# Patient Record
Sex: FEMALE | Race: ASIAN | NOT HISPANIC OR LATINO | Employment: UNEMPLOYED | URBAN - METROPOLITAN AREA
[De-identification: names, ages, dates, MRNs, and addresses within clinical notes are randomized per-mention and may not be internally consistent; named-entity substitution may affect disease eponyms.]

---

## 2017-02-13 ENCOUNTER — COMMUNICATION - HEALTHEAST (OUTPATIENT)
Dept: FAMILY MEDICINE | Facility: CLINIC | Age: 27
End: 2017-02-13

## 2017-02-13 ENCOUNTER — HOSPITAL ENCOUNTER (OUTPATIENT)
Dept: ULTRASOUND IMAGING | Facility: HOSPITAL | Age: 27
Discharge: HOME | End: 2017-02-13
Attending: PHYSICIAN ASSISTANT

## 2017-02-13 ENCOUNTER — COMMUNICATION - HEALTHEAST (OUTPATIENT)
Dept: TELEHEALTH | Facility: CLINIC | Age: 27
End: 2017-02-13

## 2017-02-13 ENCOUNTER — PRENATAL OFFICE VISIT - HEALTHEAST (OUTPATIENT)
Dept: FAMILY MEDICINE | Facility: CLINIC | Age: 27
End: 2017-02-13

## 2017-02-13 DIAGNOSIS — O20.0 THREATENED MISCARRIAGE: ICD-10-CM

## 2017-02-13 DIAGNOSIS — Z34.90 NORMAL PREGNANCY: ICD-10-CM

## 2017-02-13 DIAGNOSIS — Z32.01 POSITIVE PREGNANCY TEST: ICD-10-CM

## 2017-02-13 DIAGNOSIS — N92.6 ABNORMAL MENSES: ICD-10-CM

## 2017-02-13 LAB
HBV SURFACE AG SERPL QL IA: NEGATIVE
HIV 1+2 AB+HIV1 P24 AG SERPL QL IA: NEGATIVE

## 2017-02-14 LAB — SYPHILIS RPR SCREEN - HISTORICAL: NORMAL

## 2017-02-15 ENCOUNTER — AMBULATORY - HEALTHEAST (OUTPATIENT)
Dept: LAB | Facility: CLINIC | Age: 27
End: 2017-02-15

## 2017-02-15 DIAGNOSIS — O20.0 THREATENED MISCARRIAGE: ICD-10-CM

## 2017-02-16 ENCOUNTER — COMMUNICATION - HEALTHEAST (OUTPATIENT)
Dept: FAMILY MEDICINE | Facility: CLINIC | Age: 27
End: 2017-02-16

## 2017-02-16 DIAGNOSIS — O20.0 THREATENED MISCARRIAGE: ICD-10-CM

## 2017-02-20 ENCOUNTER — HOSPITAL ENCOUNTER (OUTPATIENT)
Dept: ULTRASOUND IMAGING | Facility: HOSPITAL | Age: 27
Discharge: HOME | End: 2017-02-20
Attending: PHYSICIAN ASSISTANT

## 2017-02-20 DIAGNOSIS — O20.0 THREATENED MISCARRIAGE: ICD-10-CM

## 2017-02-21 ENCOUNTER — COMMUNICATION - HEALTHEAST (OUTPATIENT)
Dept: FAMILY MEDICINE | Facility: CLINIC | Age: 27
End: 2017-02-21

## 2017-02-21 DIAGNOSIS — Z34.90 NORMAL PREGNANCY: ICD-10-CM

## 2017-02-21 DIAGNOSIS — O20.0 THREATENED MISCARRIAGE: ICD-10-CM

## 2017-03-09 ENCOUNTER — PRENATAL OFFICE VISIT - HEALTHEAST (OUTPATIENT)
Dept: FAMILY MEDICINE | Facility: CLINIC | Age: 27
End: 2017-03-09

## 2017-03-09 DIAGNOSIS — Z34.81 ENCOUNTER FOR SUPERVISION OF OTHER NORMAL PREGNANCY IN FIRST TRIMESTER: ICD-10-CM

## 2017-03-09 DIAGNOSIS — O20.0 THREATENED MISCARRIAGE: ICD-10-CM

## 2017-03-09 ASSESSMENT — MIFFLIN-ST. JEOR: SCORE: 1103.81

## 2017-03-10 ENCOUNTER — HOSPITAL ENCOUNTER (OUTPATIENT)
Dept: ULTRASOUND IMAGING | Facility: HOSPITAL | Age: 27
Discharge: HOME | End: 2017-03-10
Attending: FAMILY MEDICINE

## 2017-03-10 DIAGNOSIS — O20.0 THREATENED MISCARRIAGE: ICD-10-CM

## 2017-03-17 ENCOUNTER — PRENATAL OFFICE VISIT - HEALTHEAST (OUTPATIENT)
Dept: FAMILY MEDICINE | Facility: CLINIC | Age: 27
End: 2017-03-17

## 2017-03-17 ENCOUNTER — COMMUNICATION - HEALTHEAST (OUTPATIENT)
Dept: FAMILY MEDICINE | Facility: CLINIC | Age: 27
End: 2017-03-17

## 2017-03-17 DIAGNOSIS — O03.9 MISCARRIAGE: ICD-10-CM

## 2017-03-17 DIAGNOSIS — O03.9 SPONTANEOUS MISCARRIAGE: ICD-10-CM

## 2017-10-11 ENCOUNTER — PRENATAL OFFICE VISIT - HEALTHEAST (OUTPATIENT)
Dept: FAMILY MEDICINE | Facility: CLINIC | Age: 27
End: 2017-10-11

## 2017-10-11 ENCOUNTER — COMMUNICATION - HEALTHEAST (OUTPATIENT)
Dept: TELEHEALTH | Facility: CLINIC | Age: 27
End: 2017-10-11

## 2017-10-11 DIAGNOSIS — N92.6 ABNORMAL MENSES: ICD-10-CM

## 2017-10-11 DIAGNOSIS — D64.9 ANEMIA: ICD-10-CM

## 2017-10-11 DIAGNOSIS — Z23 NEED FOR IMMUNIZATION AGAINST INFLUENZA: ICD-10-CM

## 2017-10-11 DIAGNOSIS — Z32.01 POSITIVE PREGNANCY TEST: ICD-10-CM

## 2017-10-11 DIAGNOSIS — Z34.90 NORMAL PREGNANCY: ICD-10-CM

## 2017-10-11 LAB — HIV 1+2 AB+HIV1 P24 AG SERPL QL IA: NEGATIVE

## 2017-10-12 LAB
HBV SURFACE AG SERPL QL IA: NEGATIVE
SYPHILIS RPR SCREEN - HISTORICAL: NORMAL

## 2017-10-27 ENCOUNTER — HOSPITAL ENCOUNTER (OUTPATIENT)
Dept: ULTRASOUND IMAGING | Facility: HOSPITAL | Age: 27
Discharge: HOME | End: 2017-10-27
Attending: PHYSICIAN ASSISTANT

## 2017-10-27 DIAGNOSIS — Z32.01 POSITIVE PREGNANCY TEST: ICD-10-CM

## 2017-10-27 DIAGNOSIS — Z34.90 NORMAL PREGNANCY: ICD-10-CM

## 2017-10-30 ENCOUNTER — AMBULATORY - HEALTHEAST (OUTPATIENT)
Dept: FAMILY MEDICINE | Facility: CLINIC | Age: 27
End: 2017-10-30

## 2017-10-30 DIAGNOSIS — Z34.90 NORMAL PREGNANCY: ICD-10-CM

## 2017-11-17 ENCOUNTER — PRENATAL OFFICE VISIT - HEALTHEAST (OUTPATIENT)
Dept: FAMILY MEDICINE | Facility: CLINIC | Age: 27
End: 2017-11-17

## 2017-11-17 ENCOUNTER — COMMUNICATION - HEALTHEAST (OUTPATIENT)
Dept: FAMILY MEDICINE | Facility: CLINIC | Age: 27
End: 2017-11-17

## 2017-11-17 DIAGNOSIS — Z34.82 ENCOUNTER FOR SUPERVISION OF OTHER NORMAL PREGNANCY IN SECOND TRIMESTER: ICD-10-CM

## 2017-11-17 DIAGNOSIS — L70.0 ACNE VULGARIS: ICD-10-CM

## 2017-11-17 ASSESSMENT — MIFFLIN-ST. JEOR: SCORE: 1104.37

## 2017-11-30 LAB
BKR LAB AP ABNORMAL BLEEDING: NO
BKR LAB AP BIRTH CONTROL/HORMONES: NORMAL
BKR LAB AP CERVICAL APPEARANCE: NORMAL
BKR LAB AP GYN ADEQUACY: NORMAL
BKR LAB AP GYN INTERPRETATION: NORMAL
BKR LAB AP HPV REFLEX: NORMAL
BKR LAB AP LMP: NORMAL
BKR LAB AP PATIENT STATUS: NORMAL
BKR LAB AP PREVIOUS ABNORMAL: NORMAL
BKR LAB AP PREVIOUS NORMAL: NORMAL
HIGH RISK?: NO
HPV INTERPRETATION - HISTORICAL: NORMAL
HPV INTERPRETER - HISTORICAL: NORMAL
PATH REPORT.COMMENTS IMP SPEC: NORMAL
RESULT FLAG (HE HISTORICAL CONVERSION): NORMAL

## 2017-12-14 ENCOUNTER — PRENATAL OFFICE VISIT - HEALTHEAST (OUTPATIENT)
Dept: FAMILY MEDICINE | Facility: CLINIC | Age: 27
End: 2017-12-14

## 2017-12-14 ENCOUNTER — COMMUNICATION - HEALTHEAST (OUTPATIENT)
Dept: FAMILY MEDICINE | Facility: CLINIC | Age: 27
End: 2017-12-14

## 2017-12-14 DIAGNOSIS — D56.3 BETA THALASSEMIA TRAIT: ICD-10-CM

## 2017-12-14 DIAGNOSIS — D64.9 ANEMIA: ICD-10-CM

## 2017-12-14 DIAGNOSIS — K02.9 DENTAL CAVITIES: ICD-10-CM

## 2017-12-14 DIAGNOSIS — Z34.90 NORMAL PREGNANCY: ICD-10-CM

## 2017-12-29 ENCOUNTER — HOSPITAL ENCOUNTER (OUTPATIENT)
Dept: ULTRASOUND IMAGING | Facility: HOSPITAL | Age: 27
Discharge: HOME | End: 2017-12-29
Attending: FAMILY MEDICINE

## 2017-12-29 DIAGNOSIS — Z34.90 NORMAL PREGNANCY: ICD-10-CM

## 2018-01-02 ENCOUNTER — COMMUNICATION - HEALTHEAST (OUTPATIENT)
Dept: FAMILY MEDICINE | Facility: CLINIC | Age: 28
End: 2018-01-02

## 2018-01-11 ENCOUNTER — PRENATAL OFFICE VISIT - HEALTHEAST (OUTPATIENT)
Dept: FAMILY MEDICINE | Facility: CLINIC | Age: 28
End: 2018-01-11

## 2018-01-11 ENCOUNTER — RECORDS - HEALTHEAST (OUTPATIENT)
Dept: ADMINISTRATIVE | Facility: OTHER | Age: 28
End: 2018-01-11

## 2018-01-11 DIAGNOSIS — Z34.82 ENCOUNTER FOR SUPERVISION OF OTHER NORMAL PREGNANCY IN SECOND TRIMESTER: ICD-10-CM

## 2018-01-11 DIAGNOSIS — O28.3 ECHOGENIC INTRACARDIAC FOCUS OF FETUS ON PRENATAL ULTRASOUND: ICD-10-CM

## 2018-01-11 DIAGNOSIS — D64.9 ANEMIA: ICD-10-CM

## 2018-01-11 DIAGNOSIS — Z34.92 NORMAL PREGNANCY IN SECOND TRIMESTER: ICD-10-CM

## 2018-01-11 LAB
ALBUMIN UR-MCNC: NEGATIVE MG/DL
FERRITIN SERPL-MCNC: 19 NG/ML (ref 10–130)
GLUCOSE UR STRIP-MCNC: NEGATIVE MG/DL
HGB BLD-MCNC: 9.6 G/DL (ref 12–16)
KETONES UR STRIP-MCNC: NEGATIVE MG/DL

## 2018-01-11 ASSESSMENT — MIFFLIN-ST. JEOR: SCORE: 1142.93

## 2018-01-16 ENCOUNTER — RECORDS - HEALTHEAST (OUTPATIENT)
Dept: ADMINISTRATIVE | Facility: OTHER | Age: 28
End: 2018-01-16

## 2018-01-18 ENCOUNTER — COMMUNICATION - HEALTHEAST (OUTPATIENT)
Dept: FAMILY MEDICINE | Facility: CLINIC | Age: 28
End: 2018-01-18

## 2018-02-08 ENCOUNTER — PRENATAL OFFICE VISIT - HEALTHEAST (OUTPATIENT)
Dept: FAMILY MEDICINE | Facility: CLINIC | Age: 28
End: 2018-02-08

## 2018-02-08 DIAGNOSIS — Z34.82 ENCOUNTER FOR SUPERVISION OF OTHER NORMAL PREGNANCY IN SECOND TRIMESTER: ICD-10-CM

## 2018-02-08 DIAGNOSIS — O99.810 ABNORMAL GLUCOSE TOLERANCE TEST (GTT) DURING PREGNANCY, ANTEPARTUM: ICD-10-CM

## 2018-02-08 DIAGNOSIS — Z34.90 NORMAL PREGNANCY: ICD-10-CM

## 2018-02-08 DIAGNOSIS — D64.9 ANEMIA: ICD-10-CM

## 2018-02-08 DIAGNOSIS — D56.3 BETA THALASSEMIA TRAIT: ICD-10-CM

## 2018-02-08 LAB
ALBUMIN UR-MCNC: NEGATIVE MG/DL
FASTING STATUS PATIENT QL REPORTED: YES
GLUCOSE 1H P 50 G GLC PO SERPL-MCNC: 140 MG/DL (ref 70–139)
GLUCOSE UR STRIP-MCNC: NEGATIVE MG/DL
HGB BLD-MCNC: 10.6 G/DL (ref 12–16)
IRON SATN MFR SERPL: 50 % (ref 20–50)
IRON SERPL-MCNC: 173 UG/DL (ref 42–175)
KETONES UR STRIP-MCNC: NEGATIVE MG/DL
TIBC SERPL-MCNC: 348 UG/DL (ref 313–563)
TRANSFERRIN SERPL-MCNC: 278 MG/DL (ref 212–360)

## 2018-02-09 LAB — SYPHILIS RPR SCREEN - HISTORICAL: NORMAL

## 2018-03-08 ENCOUNTER — COMMUNICATION - HEALTHEAST (OUTPATIENT)
Dept: FAMILY MEDICINE | Facility: CLINIC | Age: 28
End: 2018-03-08

## 2018-03-09 ENCOUNTER — COMMUNICATION - HEALTHEAST (OUTPATIENT)
Dept: FAMILY MEDICINE | Facility: CLINIC | Age: 28
End: 2018-03-09

## 2018-03-12 ENCOUNTER — COMMUNICATION - HEALTHEAST (OUTPATIENT)
Dept: FAMILY MEDICINE | Facility: CLINIC | Age: 28
End: 2018-03-12

## 2018-03-22 ENCOUNTER — PRENATAL OFFICE VISIT - HEALTHEAST (OUTPATIENT)
Dept: FAMILY MEDICINE | Facility: CLINIC | Age: 28
End: 2018-03-22

## 2018-03-22 DIAGNOSIS — O99.810 ABNORMAL GLUCOSE TOLERANCE TEST (GTT) DURING PREGNANCY, ANTEPARTUM: ICD-10-CM

## 2018-03-22 DIAGNOSIS — D64.9 ANEMIA: ICD-10-CM

## 2018-03-22 DIAGNOSIS — Z34.83 ENCOUNTER FOR SUPERVISION OF OTHER NORMAL PREGNANCY IN THIRD TRIMESTER: ICD-10-CM

## 2018-03-22 LAB
ALBUMIN UR-MCNC: ABNORMAL MG/DL
FASTING STATUS PATIENT QL REPORTED: YES
GLUCOSE 1H P 100 G GLC PO SERPL-MCNC: 163 MG/DL
GLUCOSE 2H P 100 G GLC PO SERPL-MCNC: 159 MG/DL
GLUCOSE 3H P 100 G GLC PO SERPL-MCNC: 79 MG/DL
GLUCOSE P FAST SERPL-MCNC: 71 MG/DL
GLUCOSE UR STRIP-MCNC: NEGATIVE MG/DL
HGB BLD-MCNC: 9.6 G/DL (ref 12–16)
KETONES UR STRIP-MCNC: NEGATIVE MG/DL

## 2018-03-23 ENCOUNTER — COMMUNICATION - HEALTHEAST (OUTPATIENT)
Dept: FAMILY MEDICINE | Facility: CLINIC | Age: 28
End: 2018-03-23

## 2018-04-05 ENCOUNTER — PRENATAL OFFICE VISIT - HEALTHEAST (OUTPATIENT)
Dept: FAMILY MEDICINE | Facility: CLINIC | Age: 28
End: 2018-04-05

## 2018-04-05 DIAGNOSIS — Z34.93 NORMAL PREGNANCY IN THIRD TRIMESTER: ICD-10-CM

## 2018-04-05 DIAGNOSIS — D64.9 ANEMIA: ICD-10-CM

## 2018-04-05 DIAGNOSIS — Z34.83 ENCOUNTER FOR SUPERVISION OF OTHER NORMAL PREGNANCY IN THIRD TRIMESTER: ICD-10-CM

## 2018-04-05 LAB
ALBUMIN UR-MCNC: NEGATIVE MG/DL
GLUCOSE UR STRIP-MCNC: NEGATIVE MG/DL
KETONES UR STRIP-MCNC: NEGATIVE MG/DL

## 2018-04-26 ENCOUNTER — PRENATAL OFFICE VISIT - HEALTHEAST (OUTPATIENT)
Dept: FAMILY MEDICINE | Facility: CLINIC | Age: 28
End: 2018-04-26

## 2018-04-26 DIAGNOSIS — D56.3 BETA THALASSEMIA TRAIT: ICD-10-CM

## 2018-04-26 DIAGNOSIS — Z34.83 ENCOUNTER FOR SUPERVISION OF OTHER NORMAL PREGNANCY IN THIRD TRIMESTER: ICD-10-CM

## 2018-04-26 DIAGNOSIS — D64.9 ANEMIA: ICD-10-CM

## 2018-04-26 LAB
ALBUMIN UR-MCNC: ABNORMAL MG/DL
GLUCOSE UR STRIP-MCNC: NEGATIVE MG/DL
HGB BLD-MCNC: 9.9 G/DL (ref 12–16)
IRON SATN MFR SERPL: 10 % (ref 20–50)
IRON SERPL-MCNC: 48 UG/DL (ref 42–175)
KETONES UR STRIP-MCNC: NEGATIVE MG/DL
TIBC SERPL-MCNC: 494 UG/DL (ref 313–563)
TRANSFERRIN SERPL-MCNC: 396 MG/DL (ref 212–360)

## 2018-04-27 LAB
ALLERGIC TO PENICILLIN: NO
GP B STREP DNA SPEC QL NAA+PROBE: POSITIVE

## 2018-05-01 ENCOUNTER — PRENATAL OFFICE VISIT - HEALTHEAST (OUTPATIENT)
Dept: FAMILY MEDICINE | Facility: CLINIC | Age: 28
End: 2018-05-01

## 2018-05-01 DIAGNOSIS — Z34.83 ENCOUNTER FOR SUPERVISION OF OTHER NORMAL PREGNANCY IN THIRD TRIMESTER: ICD-10-CM

## 2018-05-01 LAB
ALBUMIN UR-MCNC: ABNORMAL MG/DL
GLUCOSE UR STRIP-MCNC: NEGATIVE MG/DL
KETONES UR STRIP-MCNC: NEGATIVE MG/DL

## 2018-05-10 ENCOUNTER — PRENATAL OFFICE VISIT - HEALTHEAST (OUTPATIENT)
Dept: FAMILY MEDICINE | Facility: CLINIC | Age: 28
End: 2018-05-10

## 2018-05-10 DIAGNOSIS — Z34.83 ENCOUNTER FOR SUPERVISION OF OTHER NORMAL PREGNANCY IN THIRD TRIMESTER: ICD-10-CM

## 2018-05-10 ASSESSMENT — MIFFLIN-ST. JEOR: SCORE: 1217.77

## 2018-05-19 ENCOUNTER — HOME CARE/HOSPICE - HEALTHEAST (OUTPATIENT)
Dept: HOME HEALTH SERVICES | Facility: HOME HEALTH | Age: 28
End: 2018-05-19

## 2018-05-20 ENCOUNTER — HOME CARE/HOSPICE - HEALTHEAST (OUTPATIENT)
Dept: HOME HEALTH SERVICES | Facility: HOME HEALTH | Age: 28
End: 2018-05-20

## 2018-07-19 ENCOUNTER — OFFICE VISIT - HEALTHEAST (OUTPATIENT)
Dept: FAMILY MEDICINE | Facility: CLINIC | Age: 28
End: 2018-07-19

## 2018-07-19 DIAGNOSIS — D64.9 ANEMIA: ICD-10-CM

## 2018-07-19 LAB
HGB BLD-MCNC: 11 G/DL (ref 12–16)
IRON SATN MFR SERPL: 40 % (ref 20–50)
IRON SERPL-MCNC: 108 UG/DL (ref 42–175)
TIBC SERPL-MCNC: 273 UG/DL (ref 313–563)
TRANSFERRIN SERPL-MCNC: 218 MG/DL (ref 212–360)

## 2019-10-14 ENCOUNTER — OFFICE VISIT - HEALTHEAST (OUTPATIENT)
Dept: FAMILY MEDICINE | Facility: CLINIC | Age: 29
End: 2019-10-14

## 2019-10-14 DIAGNOSIS — E83.52 HYPERCALCEMIA: ICD-10-CM

## 2019-10-14 DIAGNOSIS — R20.0 NUMBNESS AND TINGLING IN BOTH HANDS: ICD-10-CM

## 2019-10-14 DIAGNOSIS — R20.0 NUMBNESS AND TINGLING OF BOTH FEET: ICD-10-CM

## 2019-10-14 DIAGNOSIS — Z00.00 HEALTH CARE MAINTENANCE: ICD-10-CM

## 2019-10-14 DIAGNOSIS — R20.2 NUMBNESS AND TINGLING IN BOTH HANDS: ICD-10-CM

## 2019-10-14 DIAGNOSIS — D56.3 BETA THALASSEMIA TRAIT: ICD-10-CM

## 2019-10-14 DIAGNOSIS — R77.9 ELEVATED SERUM PROTEIN LEVEL: ICD-10-CM

## 2019-10-14 DIAGNOSIS — R20.2 NUMBNESS AND TINGLING OF BOTH FEET: ICD-10-CM

## 2019-10-14 LAB
ALBUMIN SERPL-MCNC: 4.5 G/DL (ref 3.5–5)
ALP SERPL-CCNC: 83 U/L (ref 45–120)
ALT SERPL W P-5'-P-CCNC: 15 U/L (ref 0–45)
ANION GAP SERPL CALCULATED.3IONS-SCNC: 11 MMOL/L (ref 5–18)
AST SERPL W P-5'-P-CCNC: 17 U/L (ref 0–40)
BILIRUB SERPL-MCNC: 1.1 MG/DL (ref 0–1)
BUN SERPL-MCNC: 9 MG/DL (ref 8–22)
CALCIUM SERPL-MCNC: 10.8 MG/DL (ref 8.5–10.5)
CHLORIDE BLD-SCNC: 104 MMOL/L (ref 98–107)
CO2 SERPL-SCNC: 26 MMOL/L (ref 22–31)
CREAT SERPL-MCNC: 0.69 MG/DL (ref 0.6–1.1)
ERYTHROCYTE [DISTWIDTH] IN BLOOD BY AUTOMATED COUNT: 15.9 % (ref 11–14.5)
FOLATE SERPL-MCNC: 7.9 NG/ML
GFR SERPL CREATININE-BSD FRML MDRD: >60 ML/MIN/1.73M2
GLUCOSE BLD-MCNC: 87 MG/DL (ref 70–125)
HBA1C MFR BLD: 5.1 % (ref 3.5–6)
HCT VFR BLD AUTO: 36.4 % (ref 35–47)
HGB BLD-MCNC: 11.8 G/DL (ref 12–16)
IRON SATN MFR SERPL: 28 % (ref 20–50)
IRON SERPL-MCNC: 81 UG/DL (ref 42–175)
MCH RBC QN AUTO: 21.1 PG (ref 27–34)
MCHC RBC AUTO-ENTMCNC: 32.5 G/DL (ref 32–36)
MCV RBC AUTO: 65 FL (ref 80–100)
PLATELET # BLD AUTO: 421 THOU/UL (ref 140–440)
PMV BLD AUTO: 8.1 FL (ref 7–10)
POTASSIUM BLD-SCNC: 3.9 MMOL/L (ref 3.5–5)
PROT SERPL-MCNC: 8.4 G/DL (ref 6–8)
RBC # BLD AUTO: 5.6 MILL/UL (ref 3.8–5.4)
SODIUM SERPL-SCNC: 141 MMOL/L (ref 136–145)
TIBC SERPL-MCNC: 290 UG/DL (ref 313–563)
TRANSFERRIN SERPL-MCNC: 232 MG/DL (ref 212–360)
TSH SERPL DL<=0.005 MIU/L-ACNC: 1.48 UIU/ML (ref 0.3–5)
VIT B12 SERPL-MCNC: 618 PG/ML (ref 213–816)
WBC: 10.6 THOU/UL (ref 4–11)

## 2019-10-14 ASSESSMENT — MIFFLIN-ST. JEOR: SCORE: 1104.94

## 2019-10-15 ENCOUNTER — COMMUNICATION - HEALTHEAST (OUTPATIENT)
Dept: FAMILY MEDICINE | Facility: CLINIC | Age: 29
End: 2019-10-15

## 2019-10-25 ENCOUNTER — AMBULATORY - HEALTHEAST (OUTPATIENT)
Dept: LAB | Facility: CLINIC | Age: 29
End: 2019-10-25

## 2019-10-25 DIAGNOSIS — E83.52 HYPERCALCEMIA: ICD-10-CM

## 2019-10-25 DIAGNOSIS — R77.9 ELEVATED SERUM PROTEIN LEVEL: ICD-10-CM

## 2019-10-25 LAB
CALCIUM, IONIZED MEASURED: 1.17 MMOL/L (ref 1.11–1.3)
ION CA PH 7.4: 1.16 MMOL/L (ref 1.11–1.3)
PH: 7.37 (ref 7.35–7.45)
PTH-INTACT SERPL-MCNC: 45 PG/ML (ref 10–86)

## 2019-10-28 ENCOUNTER — COMMUNICATION - HEALTHEAST (OUTPATIENT)
Dept: FAMILY MEDICINE | Facility: CLINIC | Age: 29
End: 2019-10-28

## 2019-10-28 ENCOUNTER — AMBULATORY - HEALTHEAST (OUTPATIENT)
Dept: FAMILY MEDICINE | Facility: CLINIC | Age: 29
End: 2019-10-28

## 2019-10-28 DIAGNOSIS — E56.9 VITAMIN DEFICIENCY: ICD-10-CM

## 2019-10-28 LAB
25(OH)D3 SERPL-MCNC: 7.4 NG/ML (ref 30–80)
25(OH)D3 SERPL-MCNC: 7.4 NG/ML (ref 30–80)
ALBUMIN PERCENT: 59.6 % (ref 51–67)
ALBUMIN SERPL ELPH-MCNC: 4.7 G/DL (ref 3.2–4.7)
ALPHA 1 PERCENT: 2.3 % (ref 2–4)
ALPHA 2 PERCENT: 9 % (ref 5–13)
ALPHA1 GLOB SERPL ELPH-MCNC: 0.2 G/DL (ref 0.1–0.3)
ALPHA2 GLOB SERPL ELPH-MCNC: 0.7 G/DL (ref 0.4–0.9)
B-GLOBULIN SERPL ELPH-MCNC: 0.8 G/DL (ref 0.7–1.2)
BETA PERCENT: 10.1 % (ref 10–17)
GAMMA GLOB SERPL ELPH-MCNC: 1.5 G/DL (ref 0.6–1.4)
GAMMA GLOBULIN PERCENT: 19 % (ref 9–20)
PATH ICD:: ABNORMAL
PROT PATTERN SERPL ELPH-IMP: ABNORMAL
PROT SERPL-MCNC: 7.9 G/DL (ref 6–8)
REVIEWING PATHOLOGIST: ABNORMAL

## 2020-02-19 ENCOUNTER — COMMUNICATION - HEALTHEAST (OUTPATIENT)
Dept: FAMILY MEDICINE | Facility: CLINIC | Age: 30
End: 2020-02-19

## 2020-02-19 DIAGNOSIS — E56.9 VITAMIN DEFICIENCY: ICD-10-CM

## 2020-06-04 ENCOUNTER — COMMUNICATION - HEALTHEAST (OUTPATIENT)
Dept: FAMILY MEDICINE | Facility: CLINIC | Age: 30
End: 2020-06-04

## 2020-06-04 DIAGNOSIS — E56.9 VITAMIN DEFICIENCY: ICD-10-CM

## 2020-08-03 ENCOUNTER — AMBULATORY - HEALTHEAST (OUTPATIENT)
Dept: LAB | Facility: CLINIC | Age: 30
End: 2020-08-03

## 2020-08-03 DIAGNOSIS — E56.9 VITAMIN DEFICIENCY: ICD-10-CM

## 2020-08-04 ENCOUNTER — COMMUNICATION - HEALTHEAST (OUTPATIENT)
Dept: FAMILY MEDICINE | Facility: CLINIC | Age: 30
End: 2020-08-04

## 2020-08-04 LAB
25(OH)D3 SERPL-MCNC: 36.8 NG/ML (ref 30–80)
25(OH)D3 SERPL-MCNC: 36.8 NG/ML (ref 30–80)

## 2020-10-26 ENCOUNTER — COMMUNICATION - HEALTHEAST (OUTPATIENT)
Dept: FAMILY MEDICINE | Facility: CLINIC | Age: 30
End: 2020-10-26

## 2020-11-03 ENCOUNTER — AMBULATORY - HEALTHEAST (OUTPATIENT)
Dept: NURSING | Facility: CLINIC | Age: 30
End: 2020-11-03

## 2020-11-04 ENCOUNTER — PRENATAL OFFICE VISIT - HEALTHEAST (OUTPATIENT)
Dept: FAMILY MEDICINE | Facility: CLINIC | Age: 30
End: 2020-11-04

## 2020-11-04 DIAGNOSIS — N92.6 ABNORMAL MENSES: ICD-10-CM

## 2020-11-04 DIAGNOSIS — Z3A.09 9 WEEKS GESTATION OF PREGNANCY: ICD-10-CM

## 2020-11-04 DIAGNOSIS — D64.9 ANEMIA, UNSPECIFIED TYPE: ICD-10-CM

## 2020-11-04 DIAGNOSIS — Z32.01 PREGNANCY TEST POSITIVE: ICD-10-CM

## 2020-11-04 LAB
ALBUMIN UR-MCNC: NEGATIVE MG/DL
AMPHETAMINES UR QL SCN: NORMAL
BARBITURATES UR QL: NORMAL
BASOPHILS # BLD AUTO: 0 THOU/UL (ref 0–0.2)
BASOPHILS NFR BLD AUTO: 1 % (ref 0–2)
BENZODIAZ UR QL: NORMAL
CANNABINOIDS UR QL SCN: NORMAL
COCAINE UR QL: NORMAL
CREAT UR-MCNC: 37.3 MG/DL
EOSINOPHIL # BLD AUTO: 0 THOU/UL (ref 0–0.4)
EOSINOPHIL NFR BLD AUTO: 0 % (ref 0–6)
ERYTHROCYTE [DISTWIDTH] IN BLOOD BY AUTOMATED COUNT: 18.8 % (ref 11–14.5)
GLUCOSE UR STRIP-MCNC: NEGATIVE MG/DL
HCG UR QL: POSITIVE
HCT VFR BLD AUTO: 31.5 % (ref 35–47)
HGB BLD-MCNC: 10 G/DL (ref 12–16)
HIV 1+2 AB+HIV1 P24 AG SERPL QL IA: NEGATIVE
IMM GRANULOCYTES # BLD: 0 THOU/UL
IMM GRANULOCYTES NFR BLD: 0 %
KETONES UR STRIP-MCNC: NEGATIVE MG/DL
LYMPHOCYTES # BLD AUTO: 1.3 THOU/UL (ref 0.8–4.4)
LYMPHOCYTES NFR BLD AUTO: 19 % (ref 20–40)
MCH RBC QN AUTO: 21 PG (ref 27–34)
MCHC RBC AUTO-ENTMCNC: 31.7 G/DL (ref 32–36)
MCV RBC AUTO: 66 FL (ref 80–100)
MONOCYTES # BLD AUTO: 0.4 THOU/UL (ref 0–0.9)
MONOCYTES NFR BLD AUTO: 6 % (ref 2–10)
NEUTROPHILS # BLD AUTO: 5.1 THOU/UL (ref 2–7.7)
NEUTROPHILS NFR BLD AUTO: 74 % (ref 50–70)
OPIATES UR QL SCN: NORMAL
OXYCODONE UR QL: NORMAL
PCP UR QL SCN: NORMAL
PLATELET # BLD AUTO: 370 THOU/UL (ref 140–440)
PMV BLD AUTO: 10.6 FL (ref 8.5–12.5)
RBC # BLD AUTO: 4.76 MILL/UL (ref 3.8–5.4)
WBC: 6.9 THOU/UL (ref 4–11)

## 2020-11-05 LAB
ABO/RH(D): NORMAL
ABORH REPEAT: NORMAL
ANTIBODY SCREEN: NEGATIVE
BACTERIA SPEC CULT: NO GROWTH
HBV SURFACE AG SERPL QL IA: NEGATIVE
RUBV IGG SERPL QL IA: POSITIVE
T PALLIDUM AB SER QL: NEGATIVE

## 2020-11-06 ENCOUNTER — HOSPITAL ENCOUNTER (OUTPATIENT)
Dept: ULTRASOUND IMAGING | Facility: HOSPITAL | Age: 30
Discharge: HOME | End: 2020-11-06
Attending: PHYSICIAN ASSISTANT

## 2020-11-06 DIAGNOSIS — Z32.01 PREGNANCY TEST POSITIVE: ICD-10-CM

## 2020-11-06 DIAGNOSIS — Z3A.09 9 WEEKS GESTATION OF PREGNANCY: ICD-10-CM

## 2020-11-06 LAB
COLLECTION METHOD: NORMAL
LEAD BLD-MCNC: NORMAL UG/DL
LEAD BLDV-MCNC: <2 UG/DL

## 2020-12-28 ENCOUNTER — PRENATAL OFFICE VISIT - HEALTHEAST (OUTPATIENT)
Dept: FAMILY MEDICINE | Facility: CLINIC | Age: 30
End: 2020-12-28

## 2020-12-28 DIAGNOSIS — Z34.80 ENCOUNTER FOR SUPERVISION OF OTHER NORMAL PREGNANCY, UNSPECIFIED TRIMESTER: ICD-10-CM

## 2020-12-28 DIAGNOSIS — Z3A.09 9 WEEKS GESTATION OF PREGNANCY: ICD-10-CM

## 2020-12-28 DIAGNOSIS — D56.3 BETA THALASSEMIA TRAIT: ICD-10-CM

## 2020-12-28 DIAGNOSIS — Z34.82 ENCOUNTER FOR SUPERVISION OF OTHER NORMAL PREGNANCY IN SECOND TRIMESTER: ICD-10-CM

## 2020-12-28 DIAGNOSIS — E56.9 VITAMIN DEFICIENCY: ICD-10-CM

## 2020-12-28 DIAGNOSIS — Z33.1 PREGNANT STATE, INCIDENTAL: ICD-10-CM

## 2020-12-28 LAB
ALBUMIN UR-MCNC: NEGATIVE MG/DL
GLUCOSE UR STRIP-MCNC: NEGATIVE MG/DL
HGB BLD-MCNC: 10 G/DL (ref 12–16)
IRON SATN MFR SERPL: 41 % (ref 20–50)
IRON SERPL-MCNC: 124 UG/DL (ref 42–175)
KETONES UR STRIP-MCNC: NEGATIVE MG/DL
TIBC SERPL-MCNC: 302 UG/DL (ref 313–563)
TRANSFERRIN SERPL-MCNC: 242 MG/DL (ref 212–360)

## 2020-12-31 LAB
# FETUSES US: NORMAL
AFP MOM SERPL: 0.65
AFP SERPL-MCNC: 26 NG/ML
AGE - REPORTED: 33 YR
CURRENT SMOKER: NO
FAMILY MEMBER DISEASES HX: NO
GA METHOD: NORMAL
GA: NORMAL WK
HCG MOM SERPL: 1.53
HCG SERPL-ACNC: NORMAL IU/L
HX OF HEREDITARY DISORDERS: NO
IDDM PATIENT QL: NO
INHIBIN A MOM SERPL: 1.06
INHIBIN A SERPL-MCNC: 184 PG/ML
INTEGRATED SCN PATIENT-IMP: NORMAL
PATHOLOGY STUDY: NORMAL
SPECIMEN DRAWN SERPL: NORMAL
U ESTRIOL MOM SERPL: 1.37
U ESTRIOL SERPL-MCNC: 2.09 NG/ML

## 2021-01-05 ENCOUNTER — COMMUNICATION - HEALTHEAST (OUTPATIENT)
Dept: FAMILY MEDICINE | Facility: CLINIC | Age: 31
End: 2021-01-05

## 2021-01-18 ENCOUNTER — HOSPITAL ENCOUNTER (OUTPATIENT)
Dept: ULTRASOUND IMAGING | Facility: HOSPITAL | Age: 31
Discharge: HOME | End: 2021-01-18
Attending: FAMILY MEDICINE

## 2021-01-18 DIAGNOSIS — Z34.80 ENCOUNTER FOR SUPERVISION OF OTHER NORMAL PREGNANCY, UNSPECIFIED TRIMESTER: ICD-10-CM

## 2021-01-25 ENCOUNTER — OFFICE VISIT - HEALTHEAST (OUTPATIENT)
Dept: FAMILY MEDICINE | Facility: CLINIC | Age: 31
End: 2021-01-25

## 2021-01-25 DIAGNOSIS — Z34.80 ENCOUNTER FOR SUPERVISION OF OTHER NORMAL PREGNANCY, UNSPECIFIED TRIMESTER: ICD-10-CM

## 2021-01-25 DIAGNOSIS — Z60.3 LANGUAGE BARRIER: ICD-10-CM

## 2021-01-25 DIAGNOSIS — D56.3 BETA THALASSEMIA TRAIT: ICD-10-CM

## 2021-01-25 DIAGNOSIS — E56.9 VITAMIN DEFICIENCY: ICD-10-CM

## 2021-01-25 DIAGNOSIS — Z34.82 ENCOUNTER FOR SUPERVISION OF OTHER NORMAL PREGNANCY IN SECOND TRIMESTER: ICD-10-CM

## 2021-01-25 DIAGNOSIS — Z75.8 LANGUAGE BARRIER: ICD-10-CM

## 2021-01-25 SDOH — SOCIAL STABILITY - SOCIAL INSECURITY: ACCULTURATION DIFFICULTY: Z60.3

## 2021-02-22 ENCOUNTER — COMMUNICATION - HEALTHEAST (OUTPATIENT)
Dept: FAMILY MEDICINE | Facility: CLINIC | Age: 31
End: 2021-02-22

## 2021-02-22 ENCOUNTER — PRENATAL OFFICE VISIT - HEALTHEAST (OUTPATIENT)
Dept: FAMILY MEDICINE | Facility: CLINIC | Age: 31
End: 2021-02-22

## 2021-02-22 DIAGNOSIS — D56.3 BETA THALASSEMIA TRAIT: ICD-10-CM

## 2021-02-22 DIAGNOSIS — D64.9 ANEMIA, UNSPECIFIED TYPE: ICD-10-CM

## 2021-02-22 DIAGNOSIS — O99.810 ABNORMAL GLUCOSE TOLERANCE TEST IN PREGNANCY: ICD-10-CM

## 2021-02-22 DIAGNOSIS — Z11.59 SCREENING FOR VIRAL DISEASE: ICD-10-CM

## 2021-02-22 DIAGNOSIS — Z34.82 ENCOUNTER FOR SUPERVISION OF OTHER NORMAL PREGNANCY IN SECOND TRIMESTER: ICD-10-CM

## 2021-02-22 LAB
ALBUMIN UR-MCNC: NEGATIVE MG/DL
FASTING STATUS PATIENT QL REPORTED: NO
GLUCOSE 1H P 50 G GLC PO SERPL-MCNC: 187 MG/DL (ref 70–139)
GLUCOSE UR STRIP-MCNC: NEGATIVE MG/DL
HGB BLD-MCNC: 9.2 G/DL (ref 12–16)
IRON SATN MFR SERPL: 32 % (ref 20–50)
IRON SERPL-MCNC: 112 UG/DL (ref 42–175)
KETONES UR STRIP-MCNC: NEGATIVE MG/DL
TIBC SERPL-MCNC: 347 UG/DL (ref 313–563)
TRANSFERRIN SERPL-MCNC: 277 MG/DL (ref 212–360)

## 2021-02-23 LAB — T PALLIDUM AB SER QL: NEGATIVE

## 2021-02-26 ENCOUNTER — AMBULATORY - HEALTHEAST (OUTPATIENT)
Dept: LAB | Facility: CLINIC | Age: 31
End: 2021-02-26

## 2021-02-26 ENCOUNTER — AMBULATORY - HEALTHEAST (OUTPATIENT)
Dept: FAMILY MEDICINE | Facility: CLINIC | Age: 31
End: 2021-02-26

## 2021-02-26 DIAGNOSIS — O99.810 ABNORMAL GLUCOSE TOLERANCE TEST IN PREGNANCY: ICD-10-CM

## 2021-02-26 DIAGNOSIS — O24.410 DIET CONTROLLED GESTATIONAL DIABETES MELLITUS (GDM), ANTEPARTUM: ICD-10-CM

## 2021-02-26 DIAGNOSIS — Z11.59 SCREENING FOR VIRAL DISEASE: ICD-10-CM

## 2021-02-26 LAB
FASTING STATUS PATIENT QL REPORTED: YES
GLUCOSE 1H P 100 G GLC PO SERPL-MCNC: 195 MG/DL
GLUCOSE 2H P 100 G GLC PO SERPL-MCNC: 169 MG/DL
GLUCOSE 3H P 100 G GLC PO SERPL-MCNC: 97 MG/DL
GLUCOSE P FAST SERPL-MCNC: 72 MG/DL

## 2021-03-01 LAB — HCV AB SERPL QL IA: NEGATIVE

## 2021-03-05 ENCOUNTER — AMBULATORY - HEALTHEAST (OUTPATIENT)
Dept: EDUCATION SERVICES | Facility: CLINIC | Age: 31
End: 2021-03-05

## 2021-03-05 DIAGNOSIS — O24.410 DIET CONTROLLED GESTATIONAL DIABETES MELLITUS (GDM), ANTEPARTUM: ICD-10-CM

## 2021-03-15 ENCOUNTER — AMBULATORY - HEALTHEAST (OUTPATIENT)
Dept: EDUCATION SERVICES | Facility: CLINIC | Age: 31
End: 2021-03-15

## 2021-03-15 DIAGNOSIS — O24.419 GDM (GESTATIONAL DIABETES MELLITUS): ICD-10-CM

## 2021-03-23 ENCOUNTER — OFFICE VISIT - HEALTHEAST (OUTPATIENT)
Dept: FAMILY MEDICINE | Facility: CLINIC | Age: 31
End: 2021-03-23

## 2021-03-23 DIAGNOSIS — Z34.80 ENCOUNTER FOR SUPERVISION OF OTHER NORMAL PREGNANCY, UNSPECIFIED TRIMESTER: ICD-10-CM

## 2021-03-23 DIAGNOSIS — O24.410 DIET CONTROLLED GESTATIONAL DIABETES MELLITUS (GDM), ANTEPARTUM: ICD-10-CM

## 2021-03-23 DIAGNOSIS — Z75.8 LANGUAGE BARRIER: ICD-10-CM

## 2021-03-23 DIAGNOSIS — Z60.3 LANGUAGE BARRIER: ICD-10-CM

## 2021-03-23 DIAGNOSIS — O09.893 SUPERVISION OF OTHER HIGH RISK PREGNANCIES, THIRD TRIMESTER: ICD-10-CM

## 2021-03-23 DIAGNOSIS — E56.9 VITAMIN DEFICIENCY: ICD-10-CM

## 2021-03-23 DIAGNOSIS — D56.3 BETA THALASSEMIA TRAIT: ICD-10-CM

## 2021-03-23 SDOH — SOCIAL STABILITY - SOCIAL INSECURITY: ACCULTURATION DIFFICULTY: Z60.3

## 2021-03-29 ENCOUNTER — AMBULATORY - HEALTHEAST (OUTPATIENT)
Dept: EDUCATION SERVICES | Facility: CLINIC | Age: 31
End: 2021-03-29

## 2021-03-29 DIAGNOSIS — O24.419 GDM (GESTATIONAL DIABETES MELLITUS): ICD-10-CM

## 2021-04-12 ENCOUNTER — AMBULATORY - HEALTHEAST (OUTPATIENT)
Dept: EDUCATION SERVICES | Facility: CLINIC | Age: 31
End: 2021-04-12

## 2021-04-12 DIAGNOSIS — O24.419 GDM (GESTATIONAL DIABETES MELLITUS): ICD-10-CM

## 2021-04-13 ENCOUNTER — COMMUNICATION - HEALTHEAST (OUTPATIENT)
Dept: FAMILY MEDICINE | Facility: CLINIC | Age: 31
End: 2021-04-13

## 2021-04-13 DIAGNOSIS — O24.419 GDM (GESTATIONAL DIABETES MELLITUS): ICD-10-CM

## 2021-04-16 ENCOUNTER — PRENATAL OFFICE VISIT - HEALTHEAST (OUTPATIENT)
Dept: FAMILY MEDICINE | Facility: CLINIC | Age: 31
End: 2021-04-16

## 2021-04-16 DIAGNOSIS — O09.893 SUPERVISION OF OTHER HIGH RISK PREGNANCIES, THIRD TRIMESTER: ICD-10-CM

## 2021-04-16 DIAGNOSIS — D56.3 BETA THALASSEMIA TRAIT: ICD-10-CM

## 2021-04-16 LAB
ALBUMIN UR-MCNC: NEGATIVE G/DL
GLUCOSE UR STRIP-MCNC: NEGATIVE MG/DL
HGB BLD-MCNC: 8.9 G/DL (ref 12–16)
IRON SATN MFR SERPL: 29 % (ref 20–50)
IRON SERPL-MCNC: 100 UG/DL (ref 42–175)
KETONES UR STRIP-MCNC: NEGATIVE MG/DL
TIBC SERPL-MCNC: 340 UG/DL (ref 313–563)
TRANSFERRIN SERPL-MCNC: 272 MG/DL (ref 212–360)

## 2021-04-16 ASSESSMENT — MIFFLIN-ST. JEOR: SCORE: 1172.98

## 2021-04-21 ENCOUNTER — AMBULATORY - HEALTHEAST (OUTPATIENT)
Dept: EDUCATION SERVICES | Facility: CLINIC | Age: 31
End: 2021-04-21

## 2021-04-21 DIAGNOSIS — O24.419 GDM (GESTATIONAL DIABETES MELLITUS): ICD-10-CM

## 2021-04-30 ENCOUNTER — PRENATAL OFFICE VISIT - HEALTHEAST (OUTPATIENT)
Dept: FAMILY MEDICINE | Facility: CLINIC | Age: 31
End: 2021-04-30

## 2021-04-30 DIAGNOSIS — O24.410 DIET CONTROLLED GESTATIONAL DIABETES MELLITUS (GDM), ANTEPARTUM: ICD-10-CM

## 2021-04-30 DIAGNOSIS — O09.893 SUPERVISION OF OTHER HIGH RISK PREGNANCIES, THIRD TRIMESTER: ICD-10-CM

## 2021-05-04 ENCOUNTER — RECORDS - HEALTHEAST (OUTPATIENT)
Dept: FAMILY MEDICINE | Facility: CLINIC | Age: 31
End: 2021-05-04

## 2021-05-04 ENCOUNTER — AMBULATORY - HEALTHEAST (OUTPATIENT)
Dept: EDUCATION SERVICES | Facility: CLINIC | Age: 31
End: 2021-05-04

## 2021-05-04 DIAGNOSIS — O24.410 DIET CONTROLLED GESTATIONAL DIABETES MELLITUS (GDM), ANTEPARTUM: ICD-10-CM

## 2021-05-10 ENCOUNTER — HOSPITAL ENCOUNTER (OUTPATIENT)
Dept: ULTRASOUND IMAGING | Facility: HOSPITAL | Age: 31
Discharge: HOME | End: 2021-05-10
Attending: FAMILY MEDICINE
Payer: COMMERCIAL

## 2021-05-10 ENCOUNTER — HOSPITAL ENCOUNTER (OUTPATIENT)
Dept: OBGYN | Facility: HOSPITAL | Age: 31
Discharge: HOME | End: 2021-05-10
Attending: FAMILY MEDICINE | Admitting: FAMILY MEDICINE
Payer: COMMERCIAL

## 2021-05-10 DIAGNOSIS — O24.410 DIET CONTROLLED GESTATIONAL DIABETES MELLITUS (GDM), ANTEPARTUM: ICD-10-CM

## 2021-05-11 ENCOUNTER — AMBULATORY - HEALTHEAST (OUTPATIENT)
Dept: FAMILY MEDICINE | Facility: CLINIC | Age: 31
End: 2021-05-11

## 2021-05-11 ENCOUNTER — MEDICAL CORRESPONDENCE (OUTPATIENT)
Dept: HEALTH INFORMATION MANAGEMENT | Facility: CLINIC | Age: 31
End: 2021-05-11

## 2021-05-11 DIAGNOSIS — O28.3 ABNORMAL ANTENATAL ULTRASOUND: ICD-10-CM

## 2021-05-11 DIAGNOSIS — O24.410 DIET CONTROLLED GESTATIONAL DIABETES MELLITUS (GDM), ANTEPARTUM: ICD-10-CM

## 2021-05-12 ENCOUNTER — COMMUNICATION - HEALTHEAST (OUTPATIENT)
Dept: FAMILY MEDICINE | Facility: CLINIC | Age: 31
End: 2021-05-12

## 2021-05-12 ENCOUNTER — COMMUNICATION - HEALTHEAST (OUTPATIENT)
Dept: MATERNAL FETAL MEDICINE | Facility: HOSPITAL | Age: 31
End: 2021-05-12

## 2021-05-12 ENCOUNTER — PRE VISIT (OUTPATIENT)
Dept: MATERNAL FETAL MEDICINE | Facility: CLINIC | Age: 31
End: 2021-05-12

## 2021-05-13 ENCOUNTER — HOSPITAL ENCOUNTER (OUTPATIENT)
Dept: OBGYN | Facility: HOSPITAL | Age: 31
Discharge: HOME | End: 2021-05-13
Attending: FAMILY MEDICINE | Admitting: FAMILY MEDICINE
Payer: COMMERCIAL

## 2021-05-14 ENCOUNTER — RECORDS - HEALTHEAST (OUTPATIENT)
Dept: ADMINISTRATIVE | Facility: OTHER | Age: 31
End: 2021-05-14

## 2021-05-14 ENCOUNTER — HOSPITAL ENCOUNTER (OUTPATIENT)
Dept: ULTRASOUND IMAGING | Facility: CLINIC | Age: 31
End: 2021-05-14
Attending: FAMILY MEDICINE
Payer: COMMERCIAL

## 2021-05-14 ENCOUNTER — PRENATAL OFFICE VISIT - HEALTHEAST (OUTPATIENT)
Dept: FAMILY MEDICINE | Facility: CLINIC | Age: 31
End: 2021-05-14

## 2021-05-14 ENCOUNTER — OFFICE VISIT (OUTPATIENT)
Dept: MATERNAL FETAL MEDICINE | Facility: CLINIC | Age: 31
End: 2021-05-14
Attending: FAMILY MEDICINE
Payer: COMMERCIAL

## 2021-05-14 DIAGNOSIS — O26.90 PREGNANCY RELATED CONDITION, ANTEPARTUM: ICD-10-CM

## 2021-05-14 DIAGNOSIS — E56.9 VITAMIN DEFICIENCY: ICD-10-CM

## 2021-05-14 DIAGNOSIS — Z34.83 ENCOUNTER FOR SUPERVISION OF OTHER NORMAL PREGNANCY IN THIRD TRIMESTER: ICD-10-CM

## 2021-05-14 DIAGNOSIS — O24.410 DIET CONTROLLED GESTATIONAL DIABETES MELLITUS (GDM), ANTEPARTUM: ICD-10-CM

## 2021-05-14 DIAGNOSIS — O26.90 PREGNANCY RELATED CONDITION, ANTEPARTUM: Primary | ICD-10-CM

## 2021-05-14 LAB
ALBUMIN UR-MCNC: NEGATIVE G/DL
GLUCOSE UR STRIP-MCNC: NEGATIVE MG/DL
KETONES UR STRIP-MCNC: NEGATIVE MG/DL

## 2021-05-14 PROCEDURE — 76811 OB US DETAILED SNGL FETUS: CPT

## 2021-05-14 PROCEDURE — 76811 OB US DETAILED SNGL FETUS: CPT | Mod: 26 | Performed by: OBSTETRICS & GYNECOLOGY

## 2021-05-14 NOTE — PROGRESS NOTES
"Please see \"Imaging\" tab under \"Chart Review\" for details of today's visit.    Ibis Santos MD PhD  Maternal Fetal Medicine     "

## 2021-05-15 LAB
ALLERGIC TO PENICILLIN: NO
GP B STREP DNA SPEC QL NAA+PROBE: POSITIVE

## 2021-05-18 ENCOUNTER — AMBULATORY - HEALTHEAST (OUTPATIENT)
Dept: EDUCATION SERVICES | Facility: CLINIC | Age: 31
End: 2021-05-18

## 2021-05-18 DIAGNOSIS — O24.410 DIET CONTROLLED GESTATIONAL DIABETES MELLITUS (GDM), ANTEPARTUM: ICD-10-CM

## 2021-05-21 ENCOUNTER — PRENATAL OFFICE VISIT - HEALTHEAST (OUTPATIENT)
Dept: FAMILY MEDICINE | Facility: CLINIC | Age: 31
End: 2021-05-21

## 2021-05-21 DIAGNOSIS — O09.893 SUPERVISION OF OTHER HIGH RISK PREGNANCIES, THIRD TRIMESTER: ICD-10-CM

## 2021-05-27 VITALS
OXYGEN SATURATION: 99 % | WEIGHT: 123 LBS | RESPIRATION RATE: 16 BRPM | HEART RATE: 93 BPM | SYSTOLIC BLOOD PRESSURE: 114 MMHG | DIASTOLIC BLOOD PRESSURE: 72 MMHG | BODY MASS INDEX: 24.43 KG/M2

## 2021-05-28 ENCOUNTER — PRENATAL OFFICE VISIT - HEALTHEAST (OUTPATIENT)
Dept: FAMILY MEDICINE | Facility: CLINIC | Age: 31
End: 2021-05-28

## 2021-05-28 DIAGNOSIS — O24.410 DIET CONTROLLED GESTATIONAL DIABETES MELLITUS (GDM), ANTEPARTUM: ICD-10-CM

## 2021-05-28 DIAGNOSIS — O09.893 SUPERVISION OF OTHER HIGH RISK PREGNANCIES, THIRD TRIMESTER: ICD-10-CM

## 2021-05-30 VITALS — BODY MASS INDEX: 21.03 KG/M2 | WEIGHT: 105 LBS

## 2021-05-30 VITALS — HEIGHT: 60 IN | BODY MASS INDEX: 20.62 KG/M2 | WEIGHT: 105 LBS

## 2021-05-30 VITALS — BODY MASS INDEX: 20.28 KG/M2 | WEIGHT: 103 LBS

## 2021-05-31 VITALS — HEIGHT: 60 IN | WEIGHT: 114.5 LBS | BODY MASS INDEX: 22.48 KG/M2

## 2021-05-31 VITALS — BODY MASS INDEX: 20.88 KG/M2 | WEIGHT: 106 LBS

## 2021-05-31 VITALS — BODY MASS INDEX: 21.85 KG/M2 | WEIGHT: 110 LBS

## 2021-05-31 VITALS — HEIGHT: 60 IN | BODY MASS INDEX: 20.81 KG/M2 | WEIGHT: 106 LBS

## 2021-06-01 VITALS — BODY MASS INDEX: 25.72 KG/M2 | WEIGHT: 131 LBS | HEIGHT: 60 IN

## 2021-06-01 VITALS — WEIGHT: 110 LBS | BODY MASS INDEX: 21.85 KG/M2

## 2021-06-01 VITALS — WEIGHT: 125.25 LBS | BODY MASS INDEX: 24.87 KG/M2

## 2021-06-01 VITALS — BODY MASS INDEX: 25.47 KG/M2 | WEIGHT: 128.25 LBS

## 2021-06-01 VITALS — WEIGHT: 126.25 LBS | BODY MASS INDEX: 25.07 KG/M2

## 2021-06-01 VITALS — BODY MASS INDEX: 25.72 KG/M2 | WEIGHT: 129.5 LBS

## 2021-06-01 VITALS — BODY MASS INDEX: 23.04 KG/M2 | WEIGHT: 116 LBS

## 2021-06-02 ENCOUNTER — RECORDS - HEALTHEAST (OUTPATIENT)
Dept: ADMINISTRATIVE | Facility: CLINIC | Age: 31
End: 2021-06-02

## 2021-06-02 NOTE — TELEPHONE ENCOUNTER
----- Message from Binta Gerard MD sent at 10/28/2019 10:29 AM CDT -----  Please tell Lua:  Your vitamin D level is VERY low. This may be making you feel bad.  Your calcium and parathyroid hormones are OK.    I want you to take the following:  Ergocalciferol 50,000 units two times a week (Tuesday and Saturday) for eight weeks, then reduce to once a month. This is the prescription strength. I'll send in a prescription

## 2021-06-02 NOTE — PROGRESS NOTES
"Office Visit  [facility]  Date of Service: 10/14/2019      Subjective   Lua Her is a 29 y.o. female who presents for Medication Refill (iron med)    Tingling hands and feet and around mouth when anxious    Lua here today for numbness and tingling of the hands, feet, and around the mouth.  This occurs only when she gets really anxious and \"thinks too much\".  Also happens when she is hungry.  Symptoms definitely go away when she is not feeling anxious.  She has been under a lot of stress, though no single trigger.  Symptoms have been worse over the last 2 weeks.  She does not have any history of anxiety disorder.  She has never had symptoms like this in the past.  She does have a history of fatigue in the past, but that seems better.    Her impression is that this is due to anemia.  She would like to start some iron pills.    She is not interested in treatment of anxiety at this time.    Objective   BP 96/64 (Patient Site: Left Arm, Patient Position: Sitting, Cuff Size: Adult Regular)   Pulse 86   Temp 98.1  F (36.7  C) (Oral)   Resp 14   Ht 4' 11.25\" (1.505 m)   Wt 107 lb (48.5 kg)   LMP 10/08/2019   BMI 21.43 kg/m     She reports that she has never smoked. She has never used smokeless tobacco.  Wt Readings from Last 6 Encounters:   10/14/19 107 lb (48.5 kg)   07/19/18 110 lb (49.9 kg)   05/17/18 131 lb (59.4 kg)   05/10/18 131 lb (59.4 kg)   05/01/18 129 lb 8 oz (58.7 kg)   04/26/18 128 lb 4 oz (58.2 kg)       Gen: Alert, no apparent distress.  Heart: Regular rate and rhythm, no murmurs.  Lungs: Clear to auscultation bilaterally, no increased work of breathing.  Abdomen: Soft, non-tender, non-distended, bowel sounds normal.  Extremities: No clubbing, cyanosis, edema.    Results for orders placed or performed in visit on 07/19/18   Hemoglobin   Result Value Ref Range    Hemoglobin 11.0 (L) 12.0 - 16.0 g/dL   Iron and Transferrin Iron Binding Capacity   Result Value Ref Range "    Iron 108 42 - 175 ug/dL    Transferrin 218 212 - 360 mg/dL    Transferrin Saturation, Calculated 40 20 - 50 %    Transferrin IBC, Calculated 273 (L) 313 - 563 ug/dL     No results found.    Assessment & Plan     1. Numbness and tingling of hands, feet, and lips.  Normal neurologic exam today.  Symptoms are most consistent with anxiety.  Labs are obtained, as below.  Patient does have a history of anemia due to beta thalassemia.  2. Anxiety symptom.  Check labs, as below.  Consider seeing therapist or medication to treat anxiety should symptoms persist.  3. Life stress.  4. Healthcare maintenance.  Flu shot given.      Order Summary                                                      1. Numbness and tingling in both hands  Vitamin B12    Folate, Serum    Thyroid Cascade    Glycosylated Hemoglobin A1c    Comprehensive Metabolic Panel   2. Beta thalassemia trait  Iron and Transferrin Iron Binding Capacity    HM2(CBC w/o Differential)   3. Numbness and tingling of both feet  Vitamin B12    Folate, Serum    Thyroid Cascade    Glycosylated Hemoglobin A1c    Comprehensive Metabolic Panel   4. Health care maintenance  Influenza, Seasonal Quad, PF =/> 6months      No future appointments.    Completed by: Binta Gerard M.D., [facility]. 10/14/2019 11:13 AM.  This transcription uses voice recognition software, which may contain typographical errors.

## 2021-06-03 VITALS
DIASTOLIC BLOOD PRESSURE: 64 MMHG | HEART RATE: 86 BPM | RESPIRATION RATE: 14 BRPM | WEIGHT: 107 LBS | TEMPERATURE: 98.1 F | BODY MASS INDEX: 21.57 KG/M2 | SYSTOLIC BLOOD PRESSURE: 96 MMHG | HEIGHT: 59 IN

## 2021-06-04 ENCOUNTER — COMMUNICATION - HEALTHEAST (OUTPATIENT)
Dept: SCHEDULING | Facility: CLINIC | Age: 31
End: 2021-06-04

## 2021-06-05 VITALS
BODY MASS INDEX: 23.43 KG/M2 | OXYGEN SATURATION: 99 % | SYSTOLIC BLOOD PRESSURE: 127 MMHG | RESPIRATION RATE: 16 BRPM | DIASTOLIC BLOOD PRESSURE: 78 MMHG | WEIGHT: 117 LBS | HEART RATE: 120 BPM

## 2021-06-05 VITALS
BODY MASS INDEX: 24.63 KG/M2 | SYSTOLIC BLOOD PRESSURE: 116 MMHG | WEIGHT: 123 LBS | DIASTOLIC BLOOD PRESSURE: 73 MMHG | HEART RATE: 97 BPM | RESPIRATION RATE: 20 BRPM

## 2021-06-05 VITALS
TEMPERATURE: 98 F | DIASTOLIC BLOOD PRESSURE: 75 MMHG | SYSTOLIC BLOOD PRESSURE: 118 MMHG | RESPIRATION RATE: 19 BRPM | HEART RATE: 99 BPM | HEIGHT: 59 IN | WEIGHT: 122 LBS | BODY MASS INDEX: 24.6 KG/M2

## 2021-06-05 VITALS
RESPIRATION RATE: 16 BRPM | WEIGHT: 124 LBS | SYSTOLIC BLOOD PRESSURE: 118 MMHG | HEART RATE: 116 BPM | DIASTOLIC BLOOD PRESSURE: 76 MMHG | BODY MASS INDEX: 24.83 KG/M2 | OXYGEN SATURATION: 99 %

## 2021-06-05 VITALS
SYSTOLIC BLOOD PRESSURE: 117 MMHG | DIASTOLIC BLOOD PRESSURE: 79 MMHG | HEART RATE: 106 BPM | BODY MASS INDEX: 22.83 KG/M2 | WEIGHT: 114 LBS

## 2021-06-08 NOTE — PROGRESS NOTES
Chief Complaint:  Chief Complaint   Patient presents with     Initial Prenatal Visit     Pregnancy Confirmation.     HPI:   Chanell Her is a 26 y.o. female is here for pregnancy intake.  She is .  Patient's last menstrual period was 2016.  Spotting for 4 days intermittently, fairly light.  No pelvic pain.    Past medical history: reviewed and updated.  Past Medical History:   Diagnosis Date     Urinary tract infection      Past Surgical History:   Procedure Laterality Date     NO PAST SURGERIES         Current Outpatient Prescriptions:      prenatal vit-iron fumarate-FA (PRENATAL VITAMIN) 27-0.8 mg Tab, Take 1 tablet by mouth once daily., Disp: 100 tablet, Rfl: 3    Social:  Social History   Substance Use Topics     Smoking status: Never Smoker     Smokeless tobacco: Never Used      Comment: no exposure     Alcohol use No       OBJECTIVE:  No Known Allergies  Vitals:    17 1057   BP: 102/58   Pulse: 80   Resp: 14   SpO2: 99%     Body mass index is 21.03 kg/(m^2).    Vital signs stable as recorded above  General: Patient is alert and oriented x 3, in no apparent distress  Physical exam deferred to patient's First OB Visit.    Results:  Results for orders placed or performed in visit on 17   Pregnancy (Beta-hCG, Qual), Urine   Result Value Ref Range    Pregnancy Test, Urine Positive (!) Negative   Hepatitis B Surface antigen (HBsAG)   Result Value Ref Range    Hepatitis B Surface Ag Negative Negative   HIV Antigen/Antibody Screening Cascade   Result Value Ref Range    HIV Antigen / Antibody Negative Negative   HM1 (CBC with Diff)   Result Value Ref Range    WBC 7.2 4.0 - 11.0 thou/uL    RBC 4.96 3.80 - 5.40 mill/uL    Hemoglobin 10.5 (L) 12.0 - 16.0 g/dL    Hematocrit 33.0 (L) 35.0 - 47.0 %    MCV 67 (L) 80 - 100 fL    MCH 21.2 (L) 27.0 - 34.0 pg    MCHC 31.8 (L) 32.0 - 36.0 g/dL    RDW 17.7 (H) 11.0 - 14.5 %    Platelets 410 140 - 440 thou/uL    MPV 11.3 8.5 - 12.5 fL    Neutrophils % 70 50 - 70  %    Lymphocytes % 21 20 - 40 %    Monocytes % 6 2 - 10 %    Eosinophils % 2 0 - 6 %    Basophils % 1 0 - 2 %    Neutrophils Absolute 5.0 2.0 - 7.7 thou/uL    Lymphocytes Absolute 1.5 0.8 - 4.4 thou/uL    Monocytes Absolute 0.5 0.0 - 0.9 thou/uL    Eosinophils Absolute 0.1 0.0 - 0.4 thou/uL    Basophils Absolute 0.1 0.0 - 0.2 thou/uL   Beta-hCG, Quantitative   Result Value Ref Range    Beta-hCG, Quantitative 38048 (H) 0 - 4 mlU/mL     Other screening pregnancy lab work is ordered and pending.    Patient scored a 0/30 on [place]  Depression Screen.    Assessment and Plan:  1. Pregnancy Intake Appointment.  Chanell Her is 26 y.o. and .  Patient's last menstrual period was 2016.  Estimated Date of Delivery: 10/4/17  She will be seeing Dr. Gerard for OB care.  Screening pregnancy lab work was drawn.  Prenatal vitamins prescribed.  Problem list and current medications reviewed and updated.  Dating ultrasound ordered and completed.  Prenatal info packet given.  She is interested in First Trimester Screening.    2. Threatened miscarriage.  Patient will be contacted with ultrasound and Beta HCG results.  See chart note.  Blood type O positive.  Plan on re-check of Beta HCG in 2 days, repeat ultrasound in 1 week if needed.  I discussed possibility for miscarriage and generally what to expect today in clinic, but would want to review this in more detail with her if it becomes evident she is indeed having a miscarriage.  She knows to contact us or go to the ER if concerns.    Follow up in 2 weeks.  Please see OB Episode for complete details.  Visit was approximately 35 minutes, greater than 50% of time spent in face-to-face counseling and coordination of care.    This dictation uses voice recognition software, which may contain typographical errors.

## 2021-06-09 NOTE — PROGRESS NOTES
First OB visit today.    This is a planned pregnancy.  The patient is feeling sad because of possible miscarriage.    Problems addressed today include:    Threatened AB   Bleeding for one week brown spotting, now red for two days. Mild cramping. No fever.  Ultrasound on 2/13/2017 showed 6 week 5 day intrauterine gestational sac with a yolk sac but no embryonic pole.  Ultrasound on 2/20/17 showed a pregnancy, measuring 5 weeks 6 days, without cardiac activity.  The patient has an OB/GYN appointment scheduled for next Friday, with Dr. Amos nicole at [facility].    Objective:  The patient is appropriately sad.  Her today with a [employer] , Maximiliano.  Uterine fundus is palpable above pelvic symphysis.  Fetal heart tones are not heard by Doppler today.  No significant uterine tenderness.  The patient declines vaginal exam.    Assessment and plan:  Discussed probable miscarriage, causes of miscarriage.  Discussed management options including watchful waiting, vaginal Cytotec and D&C.  Discussed option of cremation of fetal remains.  The patient will keep her appointment with Dr. Nicole for next Friday and will follow up with me early next week.  Recommended continuing prenatal vitamins as she is planning pregnancy.  Ultrasound ordered to assess viability.      I reviewed the following components of the patient chart today:    Active problems    Past Medical History    Medications    Allergies    Family History    Social History    Genetic Questionairre    Prenatal Labs    Prenatal Ultrasound  Physical exam performed, see physical exam portion of chart.    Total time 40 minutes, >50% spent in counseling and coordination of care regarding new pregnancy and review of patient's current health status and pregnancy.

## 2021-06-09 NOTE — PROGRESS NOTES
Subjective    Lua  is a 26 y.o. female who presents for follow-up of miscarriage.    On 3/10/2017, ultrasound showed previous intrauterine gestational sac no longer visualized.  Endometrial canal expanded with hypoechoic material come up to 24 mm in diameter.  On 2017, ultrasound showed 5 week 6 day intrauterine gestation without fetal cardiac activity.  Growth was not compatible with gestational age  On 2017, ultrasound showed 6 week 5 day intrauterine pregnancy without fetal cardiac activity.    The patient reports that after her ultrasound on 3/10/2017, she passed a large chunk of blood.  Since then, the cramping and bleeding has subsided significantly.  She feels well.  Bleeding is light.  Is pink and brown.  No fever.  No abdominal pain.  Planning pregnancy, but probably wanting to wait another 6 months.  Because the importance of folic acid for prevention of neural tube defect.       Objective    Blood pressure 92/62, pulse 73, resp. rate 16, weight 103 lb (46.7 kg), last menstrual period 03/10/2017, unknown if currently breastfeeding. Body mass index is 20.28 kg/(m^2). Patient reports that she has never smoked. She has never used smokeless tobacco.    Gen: Alert, no apparent distress.  Heart: Regular rate and rhythm, no murmurs.  Lungs: Clear to auscultation bilaterally, no increased work of breathing.  Abdomen: Soft, non-tender, non-distended, bowel sounds normal.  Extremities: No clubbing, cyanosis, edema.    Results for orders placed or performed in visit on 17   Beta-hCG, Quantitative   Result Value Ref Range    Beta-hCG, Quantitative 377 (H) 0 - 4 mlU/mL     No results found.        Assessment & Plan      Chanell is a 26 y.o. female who is here today for Follow-up (ultrasound)      1. Complete  (spontaneous miscarriage).  Recommend coming in in 2 weeks for recheck hCG level to confirm resolution of miscarriage.  Continue prenatal vitamin due to planning pregnancy.  Routine  counseling given.  Follow-up otherwise as needed.    1. Spontaneous miscarriage  Beta-hCG, Quantitative           This transcription uses voice recognition software, which may contain typographical errors.

## 2021-06-12 NOTE — TELEPHONE ENCOUNTER
Patient origin from [place].   Ok to have patient come in for TB gold? If yes please place in orders and send back task we will route it to  to call and help make an appointment for lab only.

## 2021-06-12 NOTE — PROGRESS NOTES
Chief Complaint:  Chief Complaint   Patient presents with     preg conf     HPI:   Chanell Her is a 30 y.o. female is here for pregnancy intake.  She is .  Patient's last menstrual period was 2020 (exact date).  Positive home pregnancy test on 20.  Some nausea, better this week.    Past medical history: reviewed and updated.  Past Medical History:   Diagnosis Date     Acne vulgaris 2017     Urinary tract infection      Past Surgical History:   Procedure Laterality Date     NO PAST SURGERIES         Current Outpatient Medications:      docusate sodium (COLACE) 100 MG capsule, Take 1 capsule (100 mg total) by mouth daily., Disp: 50 capsule, Rfl: 0     ergocalciferol (ERGOCALCIFEROL) 50,000 unit capsule, Take 1 capsule (50,000 Units total) by mouth 2 (two) times a week. For 8 weeks. Then reduce to 1 capsule MONTHLY., Disp: 16 capsule, Rfl: 1     ferrous gluconate (FERGON) 324 MG tablet, Take 1 tablet (324 mg total) by mouth daily with breakfast., Disp: 100 tablet, Rfl: 3     prenatal vitamin iron-folic acid 27mg-0.8mg (PRENATAL S) 27 mg iron- 800 mcg Tab tablet, Take 1 tablet by mouth daily., Disp: 100 tablet, Rfl: 2    Social:  Social History     Tobacco Use     Smoking status: Never Smoker     Smokeless tobacco: Never Used     Tobacco comment: no passive smoke exposure   Substance Use Topics     Alcohol use: No     Drug use: No       OBJECTIVE:  No Known Allergies  Vitals:    20 1117   BP: 117/79   Pulse: (!) 106     Body mass index is 22.83 kg/m .    Vital signs stable as recorded above  General: Patient is alert and oriented x 3, in no apparent distress  Fetal Exam: Fundal height was not palpable, fetal heart tones are not heard.    Results:  Results for orders placed or performed in visit on 20   Culture, Urine    Specimen: Urine   Result Value Ref Range    Culture No Growth    Pregnancy (Beta-hCG, Qual), Urine   Result Value Ref Range    Pregnancy Test, Urine Positive (!) Negative    ABO/RH Typing (OP order)   Result Value Ref Range    HML ABO/Rh Typing O POS     HML ABO/Rh Repeat Typing O POS    Hepatitis B Surface antigen (HBsAG)   Result Value Ref Range    Hepatitis B Surface Ag Negative Negative   HIV Antigen/Antibody Screening Cascade   Result Value Ref Range    HIV Antigen / Antibody Negative Negative   HML Antibody Screen   Result Value Ref Range    HML Antibody Screen Negative    Lead, Blood   Result Value Ref Range    Lead      Collection Method Venous    RPR   Result Value Ref Range    Treponema Antibody (Syphilis) Negative Negative   Rubella Immune Status (IgG)   Result Value Ref Range    Rubella Antibody, IgG Positive    Drugs of Abuse 1,Urine   Result Value Ref Range    Amphetamines Screen Negative Screen Negative    Benzodiazepines Screen Negative Screen Negative    Opiates Screen Negative Screen Negative    Phencyclidine Screen Negative Screen Negative    THC Screen Negative Screen Negative    Barbiturates Screen Negative Screen Negative    Cocaine Metabolite Screen Negative Screen Negative    Oxycodone Screen Negative Screen Negative    Creatinine, Urine 37.3 mg/dL   Urinalysis, OB Screen   Result Value Ref Range    Glucose, UA Negative Negative    Ketones, UA Negative Negative    Protein, UA Negative Negative mg/dL   HM1 (CBC with Diff)   Result Value Ref Range    WBC 6.9 4.0 - 11.0 thou/uL    RBC 4.76 3.80 - 5.40 mill/uL    Hemoglobin 10.0 (L) 12.0 - 16.0 g/dL    Hematocrit 31.5 (L) 35.0 - 47.0 %    MCV 66 (L) 80 - 100 fL    MCH 21.0 (L) 27.0 - 34.0 pg    MCHC 31.7 (L) 32.0 - 36.0 g/dL    RDW 18.8 (H) 11.0 - 14.5 %    Platelets 370 140 - 440 thou/uL    MPV 10.6 8.5 - 12.5 fL    Neutrophils % 74 (H) 50 - 70 %    Lymphocytes % 19 (L) 20 - 40 %    Monocytes % 6 2 - 10 %    Eosinophils % 0 0 - 6 %    Basophils % 1 0 - 2 %    Immature Granulocyte % 0 <=0 %    Neutrophils Absolute 5.1 2.0 - 7.7 thou/uL    Lymphocytes Absolute 1.3 0.8 - 4.4 thou/uL    Monocytes Absolute 0.4 0.0 -  0.9 thou/uL    Eosinophils Absolute 0.0 0.0 - 0.4 thou/uL    Basophils Absolute 0.0 0.0 - 0.2 thou/uL    Immature Granulocyte Absolute 0.0 <=0.0 thou/uL   Lead, Blood, Venous   Result Value Ref Range    Lead, Blood (Venous) <2.0 <=4.9 ug/dL     Other screening pregnancy lab work is ordered and pending.    Assessment and Plan:  1. Pregnancy Intake Appointment.  Chanell Her is 30 y.o. and .  Patient's last menstrual period was 2020 (exact date).  Estimated Date of Delivery: 21  She will be seeing Dr. Gerard for OB care.  Screening pregnancy lab work was drawn.  Prenatal vitamins prescribed.  Problem list and current medications reviewed and updated.  Dating ultrasound ordered.  Prenatal info packet given.  First trimester genetic screening test was discussed with patient.    She declines Nuchal translucency ultrasound.    She thinks she may want Quad Screen done at appropriate time.    2. Anemia, unspecified type  - ferrous gluconate (FERGON) 324 MG tablet; Take 1 tablet (324 mg total) by mouth daily with breakfast.  Dispense: 100 tablet; Refill: 3    Follow up in 4-5 weeks.  Please see OB Episode for complete details.    This dictation uses voice recognition software, which may contain typographical errors.

## 2021-06-12 NOTE — TELEPHONE ENCOUNTER
I do apologize. I read this message wrong.  please help patient make an appointment to come in to clinic for a pregnancy confirmation.

## 2021-06-12 NOTE — PATIENT INSTRUCTIONS - HE
Pregnancy: 9 weeks    Due Date: June 5, 2021    Next visit in 4-5 weeks  With Dr. Gerard  For Your First OB Visit

## 2021-06-12 NOTE — TELEPHONE ENCOUNTER
New Appointment Needed  What is the reason for the visit:    Pregnancy Confirmation Appt Needed  When was the first day of your last menstrual cycle?: 8/29/2020  Have you done a home pregnancy test?: Yes: 3.    Provider Preference: PCP only  How soon do you need to be seen?: This week   Waitlist offered?: Yes  Okay to leave a detailed message:  Yes

## 2021-06-13 NOTE — PROGRESS NOTES
Chief Complaint:  Chief Complaint   Patient presents with     Pregnancy Confirmation     pregnancy #: 5     HPI:   Chanell Her is a 27 y.o. female is here for pregnancy intake.  She is .  Patient's last menstrual period was 2017 (exact date).  Miscarriage in 2017.  She had several normal periods after that.  Positive home pregnancy test on 2017.    Past medical history: reviewed and updated.  Past Medical History:   Diagnosis Date     Urinary tract infection      Past Surgical History:   Procedure Laterality Date     NO PAST SURGERIES         Current Outpatient Prescriptions:      ferrous sulfate 325 (65 FE) MG tablet, Take 1 tablet (325 mg total) by mouth daily with breakfast. Take with glass of orange juice or vitamin C to improve absorption., Disp: 90 tablet, Rfl: 3     prenatal vitamin iron-folic acid 27mg-0.8mg (PRENATAL S) 27 mg iron- 800 mcg Tab tablet, Take 1 tablet by mouth daily., Disp: 100 tablet, Rfl: 2    Social:  Social History   Substance Use Topics     Smoking status: Never Smoker     Smokeless tobacco: Never Used      Comment: no exposure     Alcohol use No       OBJECTIVE:  No Known Allergies  Vitals:    10/11/17 1050   BP: 100/52   Pulse: 80   Resp: 20     Body mass index is 20.88 kg/(m^2).    Vital signs stable as recorded above  General: Patient is alert and oriented x 3, in no apparent distress  Fetal Exam: Fundal height was not palpable, fetal heart tones are not heard.    Results:  Results for orders placed or performed in visit on 10/11/17   Culture, Urine   Result Value Ref Range    Culture No Growth    Pregnancy (Beta-hCG, Qual), Urine   Result Value Ref Range    Pregnancy Test, Urine Positive (!) Negative   ABO/RH Typing (OP order)   Result Value Ref Range    HML ABO/Rh Typing O POS     HML ABO/Rh Repeat Typing O POS    Hepatitis B Surface antigen (HBsAG)   Result Value Ref Range    Hepatitis B Surface Ag Negative Negative   HIV Antigen/Antibody Screening Cascade    Result Value Ref Range    HIV Antigen / Antibody Negative Negative   HML Antibody Screen   Result Value Ref Range    HML Antibody Screen Negative Negative   RPR   Result Value Ref Range    Syphilis Screen Cascade Non-Reactive Non-Reactive   Rubella Immune Status (IgG)   Result Value Ref Range    Rubella IgG Immune Immune   Lead, Blood   Result Value Ref Range    Lead  <5.0 ug/dL    Collection Method Venous     Lead Retest No    Drugs of Abuse 1,Urine   Result Value Ref Range    Amphetamines Screen Negative Screen Negative    Benzodiazepines Screen Negative Screen Negative    Opiates Screen Negative Screen Negative    Phencyclidine Screen Negative Screen Negative    THC Screen Negative Screen Negative    Barbiturates Screen Negative Screen Negative    Cocaine Metabolite Screen Negative Screen Negative    Oxycodone Screen Negative Screen Negative    Creatinine, Urine 154.4 mg/dL   HM1 (CBC with Diff)   Result Value Ref Range    WBC 7.7 4.0 - 11.0 thou/uL    RBC 4.51 3.80 - 5.40 mill/uL    Hemoglobin 9.8 (L) 12.0 - 16.0 g/dL    Hematocrit 30.0 (L) 35.0 - 47.0 %    MCV 67 (L) 80 - 100 fL    MCH 21.7 (L) 27.0 - 34.0 pg    MCHC 32.7 32.0 - 36.0 g/dL    RDW 18.6 (H) 11.0 - 14.5 %    Platelets 345 140 - 440 thou/uL    MPV 10.9 8.5 - 12.5 fL    Neutrophils % 77 (H) 50 - 70 %    Lymphocytes % 16 (L) 20 - 40 %    Monocytes % 6 2 - 10 %    Eosinophils % 0 0 - 6 %    Basophils % 1 0 - 2 %    Neutrophils Absolute 5.9 2.0 - 7.7 thou/uL    Lymphocytes Absolute 1.3 0.8 - 4.4 thou/uL    Monocytes Absolute 0.5 0.0 - 0.9 thou/uL    Eosinophils Absolute 0.0 0.0 - 0.4 thou/uL    Basophils Absolute 0.0 0.0 - 0.2 thou/uL   Urinalysis, OB Screen   Result Value Ref Range    Glucose, UA Negative Negative    Ketones, UA Negative Negative    Protein, UA Trace (!) Negative mg/dL   Lead With Demographics   Result Value Ref Range    Lead, B <1.0 0.0 - 4.9 mcg/dL    Venous/Capillary Venous     Patient Street Address [address] [place] [address] [address]      [place] [facility]     [place] Zip Code [address]     Patient [facility] NA     Patient Home Phone [phone]     Patient Race      Patient Ethnicity Non      Patient Occupation NA     Patient Employer NA     Guardian First Name NA     Guardian Last Name NA     Health Care Provider Name [address]     Health Care Provider Street Address NA     Health Care Provider [facility]     Health Care Provider [facility]     Health Care Provider Zip Code NA     Health Care Provider Phone [phone]     Submitting Laboratory Phone [phone]      Other screening pregnancy lab work is ordered and pending.    Patient scored a 5/30 on [place]  Depression Screen.    Assessment and Plan:  1. Pregnancy Intake Appointment.  Chanell Her is 27 y.o. and .  Patient's last menstrual period was 2017 (exact date).  Estimated Date of Delivery: 18  She will be seeing Dr. Gerard for OB care.  Screening pregnancy lab work was drawn.  Prenatal vitamins prescribed.  Problem list and current medications reviewed and updated.  Dating ultrasound ordered.  Prenatal info packet given.    Follow up in 2-4 weeks.  Please see OB Episode for complete details.  Visit was approximately 25 minutes, greater than 50% of time spent in face-to-face counseling and coordination of care.    This dictation uses voice recognition software, which may contain typographical errors.

## 2021-06-14 NOTE — PATIENT INSTRUCTIONS - HE
"  Patient Education   HEALTHY PREGNANCY CARE: 18-22 WEEKS PREGNANT    Your baby is continuing to develop quickly. At this stage, babies can now suck their thumbs,  firmly with their hands, and are beginning to hear.    Sometime between 18 and 22 weeks, you will start to feel your baby move. At first, these small fetal movements feel like fluttering or \"butterflies.\" Some women say that they feel like gas bubbles. As the baby grows, these movements will become stronger and be able to be felt through your abdomen.     Nutrition: During this time, you may find that your nausea and fatigue are gone. Overall, you may feel better and have more energy than you did in your first trimester. Be sure you are getting enough calcium and iron in your diet. Your prenatal vitamins cannot supply all of the nutrients you need, so continue to eat 3-4 servings of dairy foods and 2-3 servings of meat/fish/poultry/nuts every day. Foods high in iron include: red meats, eggs, dark green vegetables, dark yellow vegetables, nuts, kidney beans and chickpeas. Some cereals are fortified with iron, so look at the food labels for 100% of the daily requirement for iron.     [place] for childbirth and parenting classes, including an infant CPR class. Breastfeeding classes are recommended too.    Plan for the gestational diabetes screening between weeks 24-28. You can eat normally before that visit; we would suggest making sure you have protein foods, but not a lot of carbohydrates or sugary foods.    Your blood type was determined at your first OB appointment. If you are Rh negative, you should discuss the need for a Rhogam shot with your midwife or physician.     If you had a  birth in the past, discuss a trial of labor with your midwife or physician. He or she may ask that you obtain your operative report from that  if you are wanting to have a vaginal birth after  () this time.     Think about the support you " have, and what help you can plan on from family and friends, after your baby is born. Many mothers and babies are ready to go home from the hospital within a few days. Your clinic staff is available to assist you and the [facility] staff is available to you after hours. Start preparing your other children for changes they'll experience with the new baby. Explore day care options.    You may find that you have new discomforts now, such as sleep problems or leg cramps. To access information that can help you ease these discomforts, you can refer to the Starting Out Right book or find it online at [email or web address] or [email or web address]/images/stories/[email or web address]-starting-out-[email or web address]-starting-out-[email or web address]#p=8    You can sign up for a weekly parenting e-mail that gives support, tips and advice from health care professionals that starts with pregnancy and continues through the toddler years. To register, go to [email or web address] at any time during your pregnancy.    Watch for the warning signs of premature labor:     Dull, low backache    Contractions of the uterus, menstrual-like cramps    Abdominal cramping with or without diarrhea    More pelvic pressure    Increase or change in vaginal discharge.     Remember that labor doesn't have to hurt. Never hesitate to call your midwife or physician or their staff at [facility] at Phone: [phone] for any one of these warning signs - or if something just doesn't feel right. If it's after clinic hours, physician patients should call the [facility] at [phone]); midwife patients should call their answering service at [phone].

## 2021-06-14 NOTE — PROGRESS NOTES
First OB Appointment    Assessment & Plan:  1. Dating: Final EDC is 6/5/2021. Based on sure LMP, c/w 10 week US  2. Aspirin: Based on her risk factors, Chanell is NOT at high risk of preeclampsia. Low-dose aspirin prophylaxis is NOT recommended for prevention of preeclampsia.   3. Weight: Based on prepregnancy BMI of 22.83, recommended weight gain of 25 lb (11.5 kg)-35 lb (16 kg).  4. Trisomy screening: declined NIPT. Anatomic survey + quad screen ordered.  5. Carrier screening for SMA and CF: Declined.  6. Anemia - history of betathalassemia trait, baseline hgb 11. Continue oral iron, check iron levels.     Order Summary                                                      1. 9 weeks gestation of pregnancy     2. Pregnant state, incidental  Urinalysis, OB Screen   3. Pregnancy  US OB > = 14 Weeks    Maternal Serum Screen, Alpha Fetoprotein, hCG, Estriol, and Inhibin A (Quad)   4. Vitamin deficiency  cholecalciferol, vitamin D3, (VITAMIN D3) 100 mcg (4,000 unit) cap   5. Encounter for supervision of other normal pregnancy in second trimester     6. Beta thalassemia trait  Iron and Transferrin Iron Binding Capacity    Hemoglobin      Future Appointments   Date Time Provider Department Center   1/18/2021 11:00 AM  US 2 [facility]   1/25/2021  9:00 AM Binta Gerard MD [facility]       Completed by: Binta Gerard M.D., [facility]. 12/28/2020 9:11 AM.  This transcription uses voice recognition software, which may contain typographical errors.    Subjective:  Some fatigue.    Preeclampsia risk factors:    High risk factors (1+): NONE    Moderate risk factors (2+): socioeconomic risks (, low SES)     I reviewed the following components of the patient chart today:    OB intake note    Active problems    Past Medical History    Medications    Allergies    Family History    Social History    Genetic Questionairre    Prenatal Labs    Prenatal Ultrasound    OB history  OB History     Para Term  AB Living   6 4 4 0 1 4   SAB TAB Ectopic Multiple Live Births   1 0 0 0 4      # Outcome Date GA Lbr Mc/2nd Weight Sex Delivery Anes PTL Lv   6 Current            5 Term 18 39w6d 00:52 / 00:01 7 lb 13.9 oz (3.57 kg) M Vag-Spont None N EUGENIO      Birth Comments: Anemia in pregnancy. Uncomplicated precipitous delivery. . Baby needed phototherapy for jaundice.      Complications: Precipitous delivery   4 SAB 03/10/17 9w3d          3 Term 10/05/15 38w4d 02:29 / 00:22 7 lb 11 oz (3.487 kg) M Vag-Spont None N EUGENIO      Birth Comments: Uncomplicated pregnancy. Precipitous .    2 Term 13 38w0d 06:25 / 00:53 7 lb 3 oz (3.26 kg) M Vag-Spont None N EUGENIO      Birth Comments: GBS+, high 1hr/nl 3hr GTT, anemia (iron def + alpha thal), rubella weakly immune, fetal LV echogenic focus, bacteriuria, cavities, poor wt gain. Spontaneous labor. . No lacs. EBL 150mL    1 Term 11 39w5d 18:05 / 07:48 7 lb 7 oz (3.374 kg) M Vag-Spont None N EUGENIO      Birth Comments: anemia (iron def + thal), GBS+, high 1hr/nl 3hr GTT, Spont labor. Arrest of descent complete/+1 station. Declined intervention. Elders brought herbs and tried to reposition, did a ceremony. Finally allowed pitocin. , small 2nd deg lac. EBL 200mL.        Objective:  Vitals:    20 0850   BP: 127/78   Pulse: (!) 120   Resp: 16   SpO2: 99%    Body mass index is 23.43 kg/m .   See prenatal flowsheet and physical exam portion of prenatal episode.    Total time 40 minutes, >50% spent in counseling and coordination of care regarding new pregnancy and review of patient's current health status and pregnancy.

## 2021-06-14 NOTE — PROGRESS NOTES
Assessment/Plan:    27 y.o.  at 18w0d.    Anatomic survey ordered.  Patient declines genetic screening.    Anemia, microcytic.  Due to a combination of primarily beta thalassemia trait.  Also probably some mild iron deficiency.Checked hemoglobin level.  Low hemoglobin, with normal iron studies.  Continue prenatal vitamin and ferrous sulfate.    Discussed educational topics on pregnancy checklist and delivery preferences, as documented.    Subjective:  Genetic screening.  Discussed risks, benefits, and alternatives and the patient declines everything except for the anatomy ultrasound.  The patient reports that she is taking her iron and prenatal vitamin daily.    OB ROS:    Headache: None.    Vision change: None.    Abnormal vaginal discharge: None.    Bleeding: None.    Fetal movement: Normal.    Objective:  Reviewed vitals. See flowsheet.  Exam. See flowsheet.  Mother is noted to have skin pallor.  Routine Prenatal on 2017   Component Date Value Ref Range Status     Glucose, UA 2017 Negative  Negative Final     Ketones, UA 2017 Negative  Negative Final     Protein, UA 2017 Negative  Negative mg/dL Final     Hemoglobin 2017 9.6* 12.0 - 16.0 g/dL Final     Iron 2017 139  42 - 175 ug/dL Final     Transferrin 2017 225  212 - 360 mg/dL Final     Transferrin Saturation, Calculated 2017 49  20 - 50 % Final     Transferrin IBC, Calculated 2017 282* 313 - 563 ug/dL Final       Total time: 15 minutes.   Greater than 50% spent in counseling and coordination of care regarding topics, above.

## 2021-06-14 NOTE — PROGRESS NOTES
Lua Her is a 30 y.o. female who is being evaluated via a billable video visit.      How would you like to obtain your AVS? MyChart.  If dropped from the video visit, the video invitation should be resent by: Text to cell phone: [phone]  Will anyone else be joining your video visit? No    _____________________    Virtual Visit - Video Encounter  [facility]  Date of Service: 2021    Subjective:  No problems. Baby kicking now.    OB ROS:   Headache: None.   Vision change: None.   Abnormal vaginal discharge: None.   Bleeding: None.   Fetal movement: Normal.     Objective:  There were no vitals filed for this visit.   Prepregnancy BMI: 22.83. Total weight gain: 3 lb (1.361 kg)   GENERAL: Healthy, alert and no distress  EYES: Eyes grossly normal to inspection. No discharge or erythema, or obvious scleral/conjunctival abnormalities.  RESP: No audible wheeze, cough, or visible cyanosis.  No visible retractions or increased work of breathing.    NEURO: Cranial nerves grossly intact. Mentation and speech appropriate for age.  PSYCH: Mentation appears normal, affect normal/bright, judgement and insight intact, normal speech and appearance well-groomed       Assessment/Plan:    30 y.o.  at 21w2d.    Order Summary                                                      1. Encounter for supervision of other normal pregnancy in second trimester     2. Encounter for supervision of other normal pregnancy, unspecified trimester     3. Vitamin deficiency     4. Language barrier     5. Beta thalassemia trait       Future Appointments   Date Time Provider Department Center   2021  9:40 AM Binta Gerard MD [facility]       Completed by: Binta Gerard M.D., [facility]. 2021 9:16 AM.    ____________________________  Start visit: 9:16 AM  End visit: 9:31 AM           Video-Visit Details    Type of service:  Video Visit    Video End Time (time  video stopped): 9:31 AM  Originating Location (pt. Location): Home    Distant Location (provider location):  [facility]     Platform used for Video Visit: Neville

## 2021-06-14 NOTE — PROGRESS NOTES
First OB Appointment    Assessment & Plan:  1. Beta thalassemia anemia. Hgb a bit below baseline. Iron added to cover possible concurrent iron deficiency anemia.  2. Reviewed dates.  3. The patient declines genetic screening.  4. Acne vulgaris with pustules. Rx clindamycin solution 1% BID  5. Dental caries. Recommended seeing dentist, informing them of pregnancy.  6. Hmong language.  used.    Subjective:  This is a unplanned pregnancy. The patient feels happy about it. Current pregnancy symptoms include: fatigue.     I reviewed the following components of the patient chart today:    OB intake note    Active problems    Past Medical History    Medications    Allergies    Family History    Social History    Genetic Questionairre    Prenatal Labs    Prenatal Ultrasound    OB history  OB History    Para Term  AB Living   5 3 3 0 1 3   SAB TAB Ectopic Multiple Live Births   1 0 0 0 3      # Outcome Date GA Lbr Mc/2nd Weight Sex Delivery Anes PTL Lv   5 Current            4 SAB 03/10/17 9w3d          3 Term 10/05/15 38w4d 02:29 / 00:22 7 lb 11 oz (3.487 kg) M Vag-Spont None N EUGENIO      Birth Comments: Uncomplicated pregnancy. Precipitous .    2 Term 13 38w0d 06:25 / 00:53 7 lb 3 oz (3.26 kg) M Vag-Spont None N EUGENIO      Birth Comments: GBS+, high 1hr/nl 3hr GTT, anemia (iron def + alpha thal), rubella weakly immune, fetal LV echogenic focus, bacteriuria, cavities, poor wt gain. Spontaneous labor. . No lacs. EBL 150mL    1 Term 11 39w5d 18:05 / 07:48 7 lb 7 oz (3.374 kg) M Vag-Spont None N EUGENIO      Birth Comments: anemia (iron def + thal), GBS+, high 1hr/nl 3hr GTT, Spont labor. Arrest of descent complete/+1 station. Declined intervention. Elders brought herbs and tried to reposition, did a ceremony. Finally allowed pitocin. , small 2nd deg lac. EBL 200mL.           Objective:  See prenatal flowsheet and physical exam portion of prenatal episode.    Total time 40 minutes,  >50% spent in counseling and coordination of care regarding new pregnancy and review of patient's current health status and pregnancy.

## 2021-06-14 NOTE — PATIENT INSTRUCTIONS - HE
"  Patient Education   HEALTHY PREGNANCY CARE: 14 to 18 WEEKS PREGNANT    During this time, you may start to \"show,\" so that you look pregnant to people around you. You may also notice some changes in your skin, such as an increase in acne on your face. You may notice your heart pounding, a sharp stretching ache on either side of your lower abdomen (round ligament), and more vaginal discharge.     Your baby's nerves and muscles are maturing. Sex organs are recognizable. Your baby is now able to pass urine, and your baby's first stool (meconium) is starting to collect in his or her intestines. Hair is also beginning to grow on your baby's head. Your baby is moving freely inside your uterus but you may not be able to feel it until 18-20 weeks.    Continue making healthy choices for your baby during pregnancy, including good nutrition, exercise and a safe environment free from smoking, alcohol and drugs.    Your genetic screening with a quad screen blood test may have been done today.    Watch for warning signs and contact your midwife or physician at the clinic with any concerns at [facility] at Phone: [phone]. For example, call about cramping, bleeding, abdominal pain, watery vaginal discharge, or if you are unable to keep fluids down for more than 24 hours due to vomiting.   If it's after clinic hours, physician patients should call the [facility] at [phone] ([phone]); midwife patients should call their answering service at [phone].    How can you care for yourself at home?   You can refer to the Starting Out Right book or find it online at [email or web address]/stories/[email or web address]-[email or web address]-Right.pdf or [email or web address]/images/stories/flipbooks/[facility]-starting-out-right/[facility]-starting-out-right.html#p=8     You can sign up for a weekly parenting e-mail that gives support, tips and advice from health care professionals that starts " with pregnancy and continues through the toddler years. To register, go to [email or web address] at any time during your pregnancy.

## 2021-06-14 NOTE — TELEPHONE ENCOUNTER
"Called and left message for pt to return call.# 1  \" Okay to relay message\"    ----- Message from Binta Gerard MD sent at 1/5/2021  9:34 AM CST -----  Please let Lua know that her quad screen was normal.  No increased risk of birth defects in the baby.    "

## 2021-06-15 NOTE — PROGRESS NOTES
Subjective:  Ran out of iron pills.    : Josee CREWS ROS:   Headache: None.   Vision change: None.   Abnormal vaginal discharge: None.   Bleeding: None.   Fetal movement: Normal.     Objective:  Vitals:    21 1243   BP: 118/76   Pulse: (!) 116   Resp: 16   SpO2: 99%      Prepregnancy BMI: 22.83. Total weight gain: 10 lb (4.536 kg)   Exam: see flowsheet.  Recent Results (from the past 24 hour(s))   Urinalysis, OB Screen (ketones, glucose, protein)   Result Value Ref Range    Glucose, UA Negative Negative    Ketones, UA Negative Negative    Protein, UA Negative Negative mg/dL      Assessment/Plan:    30 y.o.  at 25w2d.    Due to prepregnancy BMI of 22.83, weight gain of 10 lb (4.536 kg) is appropriate for prepregnancy BMI. The following recommendations were made: continue current diet and activity.     Anemia - due to beta thalassemia trait. Refilled iron. Check labs today.    Diabetes screen today.    Order Summary                                                      1. Encounter for supervision of other normal pregnancy in second trimester  Urinalysis, OB Screen (ketones, glucose, protein)    RPR    Hemoglobin    Glucose,Gestational Challenge (1 Hour)   2. Beta thalassemia trait  Iron and Transferrin Iron Binding Capacity   3. Anemia, unspecified type  ferrous gluconate (FERGON) 324 MG tablet      Completed by: Binta Gerard M.D., [facility]. 2021 12:54 PM.  Total time: 15 minutes. Greater than 50% spent in counseling and coordination of care regarding topics, above.

## 2021-06-15 NOTE — PROGRESS NOTES
Assessment/Plan:    27 y.o.  at 25w6d.    One hour glucose tolerance test, hemoglobin, and syphilis test done today.    Anemia: Beta thalassemia trait and anemia of pregnancy.  Check iron studies to evaluate for iron deficiency as well.    Discussed educational topics on pregnancy checklist and delivery preferences, as documented.    Subjective:  The patient has started her 1 hour glucose tolerance test.  She is not having any problems right now.  Reports that she is taking her iron pills.    OB ROS:    Headache: None.    Vision change: None.    Abnormal vaginal discharge: None.    Bleeding: None.    Fetal movement: Normal.    Objective:  Reviewed vitals. See flowsheet.  Exam. See flowsheet.   Recent Results (from the past 24 hour(s))   Urinalysis, OB Screen (ketones, glucose, protein only)   Result Value Ref Range    Glucose, UA Negative Negative    Ketones, UA Negative Negative    Protein, UA Negative Negative mg/dL        Total time: 15 minutes.   Greater than 50% spent in counseling and coordination of care regarding topics, above.

## 2021-06-15 NOTE — PROGRESS NOTES
Assessment/Plan:    27 y.o.  at 21w6d.    Fetal intracardiac echogenic focus. NIPT ordered - Progenity Innatal.    Anemia - check labs today. Continue iron.    Discussed educational topics on pregnancy checklist and delivery preferences, as documented.    Subjective:  Discussed echogenic focus on US. Discussed implications, frequency. After discussion of risks, benefits, and alternatives , the patient would like NIPT.  Discussed anemia. Currently taking iron daily, maybe 6 days out of 7.    OB ROS:    Headache: None.    Vision change: None.    Abnormal vaginal discharge: None.    Bleeding: None.    Fetal movement: Normal.    Objective:  Reviewed vitals. See flowsheet.  Exam. See flowsheet.   Recent Results (from the past 24 hour(s))   Urinalysis, OB Screen (ketones, glucose, protein only)   Result Value Ref Range    Glucose, UA Negative Negative    Ketones, UA Negative Negative    Protein, UA Negative Negative mg/dL        Total time: 20 minutes.   Greater than 50% spent in counseling and coordination of care regarding topics, above.

## 2021-06-15 NOTE — PATIENT INSTRUCTIONS - HE
Instructions   1. Continue to test blood sugar 4 times per day and daily ketones  BG monitoring goals: Fasting <95; 1 hour post start of meal <140.   2. Keep a BS and food log for review.  3. Continue to drink a lot of water to stay hydrated and dilute ketones  4. Eat 3 meals + 3 snacks as able; include adequate amounts of CHO and protein  5. Eat a bedtime snack (with protein + CHO)  6. Stay active after meals as able and safe to help manage blood sugars.  7. Call (at [phone]) with 3 high BS in any one area/higher than trace ketones in a 7 -Day period.  8. F/u on 3/29  9.Ob/gyn: 3/23

## 2021-06-16 PROBLEM — Z34.80 ENCOUNTER FOR SUPERVISION OF OTHER NORMAL PREGNANCY, UNSPECIFIED TRIMESTER: Status: ACTIVE | Noted: 2020-12-28

## 2021-06-16 PROBLEM — E56.9 VITAMIN DEFICIENCY: Status: ACTIVE | Noted: 2019-10-28

## 2021-06-16 PROBLEM — O24.410 DIET CONTROLLED GESTATIONAL DIABETES MELLITUS (GDM), ANTEPARTUM: Status: ACTIVE | Noted: 2021-02-26

## 2021-06-16 PROBLEM — O28.3 ABNORMAL ANTENATAL ULTRASOUND: Status: ACTIVE | Noted: 2021-05-11

## 2021-06-16 PROBLEM — O99.820 GBS (GROUP B STREPTOCOCCUS CARRIER), +RV CULTURE, CURRENTLY PREGNANT: Status: ACTIVE | Noted: 2021-05-17

## 2021-06-16 NOTE — PROGRESS NOTES
SPOKE WITH PT, PT STATE WILL WAIT UNTIL AFTER PT GIVE BIRTH, PT WILL CALL TO SCHEDULE APPT FOR COVID VACCINE. COMPLETE TASK.

## 2021-06-16 NOTE — TELEPHONE ENCOUNTER
Please schedule Lua for the following:  -OB visits every other week (in person) from this week through 5/4/21. OK to double book.  -OB visits weekly (in person) from 5/5 through 6/12/21.

## 2021-06-16 NOTE — PROGRESS NOTES
Lua Her is a 30 y.o. female who is being evaluated via a billable video visit.      How would you like to obtain your AVS? MyChart.  If dropped from the video visit, the video invitation should be resent by: Text to cell phone: [phone]  Will anyone else be joining your video visit? No      ____________________________    Virtual Visit - Video Encounter  [facility]  Family Medicine  Date of Service: 3/23/2021    Subjective:    Checking blood sugar four times a day.  Portioning foods.   No sugars >140 postprandial.  Exercises after eating    OB ROS:   Headache: None.   Vision change: None.   Abnormal vaginal discharge: None.   Bleeding: None.   Fetal movement: Normal.     Objective:  There were no vitals filed for this visit.   Prepregnancy BMI: 22.83. Total weight gain: 10 lb (4.536 kg)   GENERAL: Healthy, alert and no distress  EYES: Eyes grossly normal to inspection. No discharge or erythema, or obvious scleral/conjunctival abnormalities.  RESP: No audible wheeze, cough, or visible cyanosis.  No visible retractions or increased work of breathing.    NEURO: Cranial nerves grossly intact. Mentation and speech appropriate for age.  PSYCH: Mentation appears normal, affect normal/bright, judgement and insight intact, normal speech and appearance well-groomed    Assessment/Plan:    30 y.o.  at 29w3d.    GDM - diet controlled, well controlled.     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 Discussed option of Covid immunization    Order Summary                                                      1. Supervision of other high risk pregnancies, third trimester     2. Diet controlled gestational diabetes mellitus (GDM), antepartum     3. Encounter for supervision of other normal pregnancy, unspecified trimester     4. Vitamin deficiency     5. Language barrier     6. Beta thalassemia trait       Future Appointments   Date Time Provider Department Center   3/29/2021  1:00 PM Kelly Perry, Kevin  [facility]       Completed by: Binta Gerard M.D., [facility]. 3/23/2021 3:30 PM.    ____________________________  Start visit: 3:30 PM  End visit: 3:46 PM   Video-Visit Details    Type of service:  Video Visit    Video End Time (time video stopped): 3:47 PM  Originating Location (pt. Location): Home    Distant Location (provider location):  [facility]     Platform used for Video Visit: CherWell

## 2021-06-16 NOTE — TELEPHONE ENCOUNTER
I sent in rx:    Diagnoses and all orders for this visit:    GDM (gestational diabetes mellitus)  -     generic lancets; Use 1 each As Directed 4 (four) times a day.  -     blood glucose test (CONTOUR NEXT TEST STRIPS) strips; Use 1 each As Directed 4 (four) times a day.

## 2021-06-16 NOTE — PROGRESS NOTES
Subjective:    GDM   Diet- controlled  Well controlled.    OB ROS:   Headache: None.   Vision change: None.   Abnormal vaginal discharge: None.   Bleeding: None.   Fetal movement: Normal.     Objective:  Vitals:    21 1021   BP: 118/75   Pulse: 99   Resp: 19   Temp: 98  F (36.7  C)      Prepregnancy BMI: 22.83. Total weight gain: 8 lb (3.629 kg)   Exam: see flowsheet.  Routine Prenatal on 2021   Component Date Value Ref Range Status     Hemoglobin 2021 8.9* 12.0 - 16.0 g/dL Final     Iron 2021 100  42 - 175 ug/dL Final     Transferrin 2021 272  212 - 360 mg/dL Final     Transferrin Saturation, Calculated 2021 29  20 - 50 % Final     Transferrin IBC, Calculated 2021 340  313 - 563 ug/dL Final     Glucose, UA 2021 Negative  Negative Final     Ketones, UA 2021 Negative  Negative Final     Protein, UA 2021 Negative  Negative Final       Assessment/Plan:    30 y.o.  at 32w6d.    Due to prepregnancy BMI of 22.83, weight gain of 8 lb (3.629 kg) is insufficient. The following recommendations were made: high nutrient diet.    Gestational diabetes - well controlled with diet.    Weight loss - discussed nutrition, protein/nutrients.    Severe anemia due to beta thalassemia trait - continue oral iron.    Discussed risks/benefits/unknowns of Covid/Covid immunization. She will wait on shot for now.  Future Appointments   Date Time Provider Department Center   2021  1:00 PM Kelly Perry, Diabetes Ed [facility]N DI[facility]ED [facility]   2021 10:20 AM Binta Gerard MD [facility]   2021 11:00 AM Binta Gerard MD [facility]   2021 10:20 AM Binta Gerard MD [facility]   2021  8:40 AM Binta Gerard MD [facility]   2021 10:00 AM Binta Gerard MD [facility]   2021 10:00 AM Binta Gerard MD [facility]   2021 10:00 AM Binta Gerard MD [facility]    7/19/2021 10:00 AM Binta Gerard MD [facility] OB [facility]      Order Summary                                                      1. Supervision of other high risk pregnancies, third trimester  Tdap vaccine greater than or equal to 8yo IM   2. Beta thalassemia trait  Hemoglobin    Iron and Transferrin Iron Binding Capacity      Completed by: Binta Gerard M.D., [facility]. 4/16/2021 10:45 AM.  15 minutes spent on the day of encounter doing chart review, history, exam, documentation, and review of Chanell's current health status and pregnancy.

## 2021-06-16 NOTE — PROGRESS NOTES
Subjective:  No problems    OB ROS:   Headache: None.   Vision change: None.   Abnormal vaginal discharge: None.   Bleeding: None.   Fetal movement: Normal.     Objective:  Reviewed vitals. Exam - see flowsheet.   No results found for this or any previous visit (from the past 24 hour(s)).     Assessment/Plan:    27 y.o.  at 32w3d.    Elevated 1 hour glucose tolerance test.  Follow-up testing today with 3 hour glucose testing was normal.    History of anemia due to a combination of iron deficiency and beta thalassemia.  Currently on oral iron.  Iron is ordered, but not done.  She will need to have this done next time.  1. Encounter for supervision of other normal pregnancy in third trimester  Urinalysis, OB Screen (ketones, glucose, protein)    Hemoglobin    Tdap vaccine greater than or equal to 6yo IM   2. Anemia  Iron and Transferrin Iron Binding Capacity   3. Abnormal glucose tolerance test (GTT) during pregnancy, antepartum  Glucose, Gestational Challenge (3 Hour)    Glucose, Gestational Challenge Fasting    Glucose, Gestational Challenge 1 Hour    Glucose, Gestational Challenge 2 Hour    Glucose, Gestational Challenge 3 Hour      Total time: 15 minutes. Greater than 50% spent in counseling and coordination of care regarding topics, above.

## 2021-06-16 NOTE — TELEPHONE ENCOUNTER
----- Message from Kelly Perry, Diabetes Ed sent at 4/12/2021  4:45 PM CDT -----  Dr. Gerard,    I saw the pt today. Pt stated that she is almost out of her testing supplies and that pharmacy won't fill her rx.I called [facility] later and spoke to Po. An updated rx for test strips + lancets (to test 3x/d) was sent in. Pt will be able to  today/tomorrow.    Since the recommendation is to test 4x/d for GDM patients, we will need to send in Rx with PA for that, would appreciate if we could initiate that.     Thanks!   Kelly Perry RDN, LD  Diabetes Care and Education

## 2021-06-16 NOTE — PATIENT INSTRUCTIONS - HE
Patient Education   HEALTHY PREGNANCY CARE: 30-34 WEEKS PREGNANT    You have made it to the final months of your pregnancy. By now, your baby is starting to fill out with some fat under his skin, fuzzy hair on his shoulders, and is gaining 4 to 6 ounces per week.    Discuss any travel plans with your midwife or physician.    Review possible changes in sexuality during later pregnancy and discuss these with your midwife or physician, as well as your partner. Alternative love-making positions may be more comfortable.    Discuss labor and delivery issues with your midwife or physician. If you had a  birth in the past, discuss a trial of labor with your midwife or physician. He or she may ask that you sign a consent form, if you wish to have a vaginal birth after  (). Ask your midwife or physician to explain your options for managing pain during your labor and delivery. Sometimes, during the birth process, an episiotomy may need to be cut in the vagina to make the opening bigger or let the baby come out quicker. You may want to discuss the episiotomy and how often it is needed with your midwife or physician.    Plan for your baby's care by selecting a child health care provider (Family physician, Pediatrician, or Pediatric Nurse Practitioner). Practice installing an infant car seat correctly in the car. Ask for car seat information as needed and make sure it is safe and will work in the car your baby will ride in. You will need a car seat to bring your baby home from the hospital. Check the procedure for adding your baby to your health care plan. Review your decision about circumcision and ask for any information you need. As you buy and receive items for your baby, don't put a baby walker on your list. Walkers can be dangerous and can cause serious injury to your child. A safer option is a saucer-type play station, since it doesn't allow baby to travel across the floor.    Discuss your choices and  plans for birth control with your midwife or physician. Women who are breastfeeding can still become pregnant. Use a birth control method if you want to lower your pregnancy risk. Talk to your midwife or physician if you are considering permanent birth control, such as tubal ligation or Essure. You may need to complete a consent form 30 days prior to delivery in order to have this done after you deliver.    Continue to watch for signs and symptoms of preeclampsia:     Sudden swelling of your face, hands, or feet     New vision problems such as blurring, double vision, or flashing lights    A severe headache not relieved with acetaminophen (Tylenol)    Sharp or stabbing pain in your right or middle upper abdomen    Watch for signs and symptoms of premature labor:     Regular contractions. This means having about 6 or more within 1 hour, even after you have had a glass of water and are resting.     A backache that starts and stops regularly.    An increase or change in vaginal discharge, such as heavy, mucus-like, watery, or bloody discharge.     Your water breaks or leaks.    If you have any of the above symptoms or any other concerns, call your provider or their clinic staff at [facility] at Phone: [phone]. If it's after clinic hours, physician patients should call the [facility] at [phone] ([phone]); midwife patients should call their answering service at [phone].    How can you care for yourself at home?   You can refer to the Starting Out Right book or find it online at [email or web address]-[email or web address] or [email or web address]/[email or web address]-starting-[email or web address]-[email or web address]-starting-[email or web address]-right.html#p=8     You can sign up for a weekly parenting e-mail that gives support, tips and advice from health care professionals that starts with pregnancy and continues through the toddler years. To register, go  to [email or web address] at any time during your pregnancy.

## 2021-06-16 NOTE — PROGRESS NOTES
LMTCB #1 @ [phone] see if patient was interested in getting the COVID-19 Vaccine at [facility] on 3/27 if patient hasn't already.    If patient got it, please notate this information down and re-route it to the provider pool. Thanks.    Name of vaccine:  Place of vaccination:  Date of 1st dose:  Date of 2nd dose:  Lot #: (patient may or may not have this)

## 2021-06-16 NOTE — PROGRESS NOTES
Please schedule for Covid shot, if she is interested in getting it (her  might need it, too.). Diabetes + pregnancy.

## 2021-06-17 NOTE — PROGRESS NOTES
Subjective:    GDM  All blood sugars are OK    Femur length  Had US at [facility] - no other abnormalities noted    OB ROS:   Headache: None.   Vision change: None.   Abnormal vaginal discharge: Yes. White, not itchy or smelly..   Bleeding: None.   Fetal movement: Normal.     Objective:  Vitals:    21 0840   BP: 104/67   Pulse: 84      Prepregnancy BMI: 22.83. Total weight gain: 14 lb (6.35 kg)   Exam: see flowsheet.  Recent Results (from the past 24 hour(s))   Urinalysis, OB Screen (ketones, glucose, protein)   Result Value Ref Range    Glucose, UA Negative Negative    Ketones, UA Negative Negative    Protein, UA Negative Negative         Assessment/Plan:    30 y.o.  at 37w6d.    Due to prepregnancy BMI of 22.83, weight gain of 14 lb (6.35 kg) is insufficient. The following recommendations were made: continue current diet and activity.    Short femur length - likely normal variant    GBS positive. Discussed intrapartum PCN.  Future Appointments   Date Time Provider Department Center   2021 10:00 AM Binta Gerard MD [facility]   2021 10:00 AM Binta Gerard MD [facility]   2021 10:00 AM Binta Gerard MD [facility]   2021 10:00 AM Binta Gerard MD [facility]      Order Summary                                                      1. Supervision of other high risk pregnancies, third trimester  Urinalysis, OB Screen (ketones, glucose, protein)      Completed by: Binta Gerard M.D., [facility]. 2021 8:57 AM.  15 minutes spent on the day of encounter doing chart review, history, exam, documentation, and review of Chanell's current health status and pregnancy.

## 2021-06-17 NOTE — PROGRESS NOTES
Type of Service: Telephone Visit/ 12 minutes    Patient would like to receive their AVS by AVS Preference: Ashley      [facility] GDM Care Plan    Assessment:   --GDM follow up visit for Chanell through telephonic ong   --patient continues to have a good appetite: consuming three meals/ and snacks, hydrating with water.  --she continues to do her household chores and some walking throughout her day  --    Ketones:  Negative    Blood glucose record:  All values the week of 5/3/21 and 5/10/21 met goals for FBS and post meal.   Patient did have three FBS < 70 mg/dl, we did discuss treatment for hypoglycemia    Plan:  1. Test glucose 3x/day and daily ketones until delivery.  2. Call into Diabetes Care if you have small, moderate, large ketones or three unexplainable elevated BG in one week.  3. Exercise as allowed per MD  4. Continue with three meal/ and three snacks, water for hydration.       Visit Type: GDM Individual Follow-up  Time: 12 minutes  Visit #: 7  Provider: Binta Gerard  Provider's Diagnosis (per referral form): Gestational (648.83)  Weight:    OGTT:   Results for orders placed or performed in visit on 02/26/21   Glucose, Gestational Challenge 2 Hour   Result Value Ref Range    Glucose, 2 Hour 169 (H) 60-<155 mg/dL   Glucose, Gestational Challenge 1 Hour   Result Value Ref Range    Glucose, 1 Hour 195 (H) 60-<180 mg/dL   Glucose, Gestational Challenge Fasting   Result Value Ref Range    Glucose, Fasting 72 60-<95 mg/dL    Patient Fasting > 8hrs? Yes    Glucose, Gestational Challenge 3 Hour   Result Value Ref Range    Glucose, 3 hour 97 60-<140 mg/dL      Estimated Date of Delivery: 6/5/21   Pregnancy #: 6  Previous GDM: No   Medications:   Current Outpatient Medications:      acetone, urine, test Strp, Test once a day, Disp: 100 strip, Rfl: 0     alcohol swabs PadM, Apply 1 each topically daily., Disp: 100 each, Rfl: 3     blood glucose test (CONTOUR NEXT TEST STRIPS) strips, Use 1 each As Directed 4  (four) times a day., Disp: 100 strip, Rfl: 6     docusate sodium (COLACE) 100 MG capsule, Take 1 capsule (100 mg total) by mouth daily., Disp: 50 capsule, Rfl: 0     ferrous gluconate (FERGON) 324 MG tablet, Take 1 tablet (324 mg total) by mouth daily with breakfast., Disp: 100 tablet, Rfl: 3     generic lancets, Use 1 each As Directed 4 (four) times a day., Disp: 100 each, Rfl: 6     prenatal vitamin iron-folic acid 27mg-0.8mg (PRENATAL S) 27 mg iron- 800 mcg Tab tablet, Take 1 tablet by mouth daily., Disp: 100 tablet, Rfl: 2     VITAMIN D3 50 mcg (2,000 unit) capsule, , Disp: , Rfl:     BG monitoring goals: Fasting <95; 1 hour post start of meal <140. Test 4 x per day.  Check fasting a.m. ketones: Yes  GDM meal pattern/carb counting taught per guidelines: Yes    Past Goals:  Nutrition: GDM meal plan - on track  Activity: Walking after meals when able/staying active -on track  Monitoring: BG 4x/day as directed, ketones every morning - testing ketones daily, testing BG x 3/day, insurance only pays for three test strips/day      New Goals:  Nutrition: GDM meal plan   Activity: Walking after meals when able/staying active   Monitoring: BG 4x/day as directed, ketones every morning    DIABETES CARE PLAN AND EDUCATION RECORD    Gestational Diabetes Disease Process/Preconception Care/Management During Pregnancy/Postpartum:Discussed    Meter (per above goals): Assessed, Discussed and Competent    Nutrition Management    Weight: Assessed and Discussed  Portions/Balance: Assessed and Discussed  Carb ID/Count: Assessed and Discussed  Label Reading: Assessed and Discussed  Menu Planning: Assessed and Discussed  Dining Out: Assessed and Discussed  Physical Activity: Assessed and Discussed    Acute Complications: Prevent, Detect, Treat:    Goal Setting and Problem Solving: Assessed and Discussed  Barriers: Assessed and Discussed  Psychosocial Adjustments: Assessed and Discussed

## 2021-06-17 NOTE — PROGRESS NOTES
Subjective:  Contractions since yesterday.  The patient reports that she has everything she needs for the baby including the car seat, crib, and thermometer.    OB ROS:   Headache: None.   Vision change: None.   Abnormal vaginal discharge: None.   Bleeding: None.   Fetal movement: Normal.     Objective:  Reviewed vitals. Exam - see flowsheet.   Recent Results (from the past 24 hour(s))   Urinalysis, OB Screen (ketones, glucose, protein)   Result Value Ref Range    Glucose, UA Negative Negative    Ketones, UA Negative Negative    Protein, UA Trace (!) Negative mg/dL        Assessment/Plan:    27 y.o.  at 37w4d.  1. Encounter for supervision of other normal pregnancy in third trimester  Urinalysis, OB Screen (ketones, glucose, protein)      Total time: 15 minutes. Greater than 50% spent in counseling and coordination of care regarding topics, above.

## 2021-06-17 NOTE — TELEPHONE ENCOUNTER
Provider response below relayed to patient. Message understood.    ----- Message from Binta Gerard MD sent at 5/11/2021  4:51 PM CDT -----  Please tell Lua:    Your baby's ultrasound testing was normal.   She appears to have short legs and a round tummy.   Her weight is a little above average at the 66%tile.    I would recommend:   1) Repeat the NST (the heart beat part of the test) later this week.  2) Take really good care of your diabetes.  3) See [facility] for a detailed ultrasound of your baby to make sure that her short legs are just because she's not tall, not a problem.

## 2021-06-17 NOTE — PROGRESS NOTES
Type of Service: Telephone Visit/ 35 minutes    Patient would like to receive their AVS by AVS Preference: Ashley     [facility] GDM Care Plan    Assessment:   --GDM follow up visit for Chanell  --patient continues to have a good appetite, having three meals/day, some snacks:  Am: yellow rice/ chicken/vegetables  Noon: salad or beef/rice  Evening: soup or yogurt or fruit  --patient drinking water throughout the day  --patient walking during the day, household chores/cleaning.    Ketones  --patient checking once weekly: negative    Blood glucose record: patient is testing three times/daily, as her insurance does not pay for 4x/day  FBS: 73,81,73,77,82,81,80  Post am meal: 138,113,114,101,130,112,102  Post evening meal: 126,120,117,125,135,98,130    Plan:  1. Test glucose 3x/day and daily ketones, to assure she is getting enough food to eat and not waiting too long between meals.  2. Have three meals and snacks per GDM meal plan.  3. Add in walking after meals as tolerated and allowed per MD>  4. Patient to call Diabetes Care team if she has three elevated BG in one week that she cannot explain or small/moderate ketones/  5. Follow up with educator on 5/18/21    Visit Type: GDM Individual Follow-up  Time: 35 minutes  Visit #: 6  Provider: Binta Gerard  Provider's Diagnosis (per referral form): Gestational (648.83)  Weight:    OGTT:   Results for orders placed or performed in visit on 02/26/21   Glucose, Gestational Challenge 2 Hour   Result Value Ref Range    Glucose, 2 Hour 169 (H) 60-<155 mg/dL   Glucose, Gestational Challenge 1 Hour   Result Value Ref Range    Glucose, 1 Hour 195 (H) 60-<180 mg/dL   Glucose, Gestational Challenge Fasting   Result Value Ref Range    Glucose, Fasting 72 60-<95 mg/dL    Patient Fasting > 8hrs? Yes    Glucose, Gestational Challenge 3 Hour   Result Value Ref Range    Glucose, 3 hour 97 60-<140 mg/dL      Estimated Date of Delivery: 6/5/21   Pregnancy #: 6  Previous GDM: No   Medications:    Current Outpatient Medications:      acetone, urine, test Strp, Test once a day, Disp: 100 strip, Rfl: 0     alcohol swabs PadM, Apply 1 each topically daily., Disp: 100 each, Rfl: 3     blood glucose test (CONTOUR NEXT TEST STRIPS) strips, Use 1 each As Directed 4 (four) times a day., Disp: 100 strip, Rfl: 6     docusate sodium (COLACE) 100 MG capsule, Take 1 capsule (100 mg total) by mouth daily., Disp: 50 capsule, Rfl: 0     ferrous gluconate (FERGON) 324 MG tablet, Take 1 tablet (324 mg total) by mouth daily with breakfast., Disp: 100 tablet, Rfl: 3     generic lancets, Use 1 each As Directed 4 (four) times a day., Disp: 100 each, Rfl: 6     prenatal vitamin iron-folic acid 27mg-0.8mg (PRENATAL S) 27 mg iron- 800 mcg Tab tablet, Take 1 tablet by mouth daily., Disp: 100 tablet, Rfl: 2     VITAMIN D3 50 mcg (2,000 unit) capsule, , Disp: , Rfl:     BG monitoring goals: Fasting <95; 1 hour post start of meal <140. Test 4 x per day.  Check fasting a.m. ketones: Yes once weekly  GDM meal pattern/carb counting taught per guidelines: Yes    Past Goals:  Nutrition: GDM meal plan - having two to three meal/day  Activity: Walking after meals when able/staying active - on track  Monitoring: BG 4x/day as directed, ketones every morning - checking ketones weekly, BG 3x/day, as her insurance covers three strips/day      New Goals:  Nutrition: GDM meal plan   Activity: Walking after meals when able/staying active   Monitoring: BG 4x/day as directed, ketones every morning    DIABETES CARE PLAN AND EDUCATION RECORD    Gestational Diabetes Disease Process/Preconception Care/Management During Pregnancy/Postpartum:Discussed    Meter (per above goals): Competent    Nutrition Management    Weight: Assessed and Discussed  Portions/Balance: Assessed and Discussed  Carb ID/Count: Assessed and Discussed  Label Reading: Assessed and Discussed  Menu Planning: Assessed and Discussed  Dining Out: Assessed and Discussed  Physical Activity:  Assessed and Discussed    Acute Complications: Prevent, Detect, Treat:    Goal Setting and Problem Solving: Assessed and Discussed  Barriers: Assessed and Discussed  Psychosocial Adjustments: Assessed and Discussed

## 2021-06-17 NOTE — PATIENT INSTRUCTIONS - HE
Short legs  Big tummy    The femur length is less than the second percentile. The head circumference is at the lower limits of normal. The biparietal diameter is at the 29th percentile. The abdominal circumference is at the 97th percentile.   Patient Education   HEALTHY PREGNANCY CARE: 34-36 WEEKS PREGNANT    Your baby is gaining about an ounce each day, so healthy nutrition and rest are still very important. Learn about late pregnancy symptoms, such as constipation and backaches. Drinking more fluids and eating more fiber can relieve constipation. The pelvic tilt and a heating pad can ease backaches.    Continue to watch for signs and symptoms of preeclampsia:     Sudden swelling of your face, hands, or feet     New vision problems such as blurring, double vision, or flashing lights    A severe headache not relieved with acetaminophen (Tylenol)    Sharp or stabbing pain in your right or middle upper abdomen    You're almost done with your pregnancy but it's still too soon to have your baby. The goal is to have your baby after 37 weeks, so watch for signs and symptoms of premature labor:     Regular contractions. This means having about 6 or more within 1 hour, even after you have had a glass of water and are resting.     A backache that starts and stops regularly.    An increase or change in vaginal discharge, such as heavy, mucus-like, watery, or bloody discharge.     Your water breaks or leaks.    You will be tested for group B streptococcus (GBS), a type of bacteria found in 10-30% of pregnant women. A woman with GBS can pass it to her baby during delivery. Most babies who get GBS from their mothers do not have any problems, but some babies get very ill and need to be hospitalized for antibiotic treatment. Treating you with antibiotics during labor and delivery helps to prevent infection in your baby.    Review information on postpartum care that you will need after the baby is born.  Discuss your choices and  "plans for birth control with your midwife or physician.     Postpartum Care  During the days and weeks after the delivery of your baby (postpartum period), your body will change as it returns to its nonpregnant condition. As with pregnancy changes, postpartum changes are different for every woman.    Physical changes after childbirth  The changes in your body may include:    Contractions called afterpains shrink the uterus for several days after childbirth. Shrinking of the uterus to its prepregnancy size may take 6 to 8 weeks.    Sore muscles (especially in the arms, neck, or jaw) are common after childbirth. This is because of the hard work of labor and pushing your baby out. The soreness should go away in a few days.    Bleeding and vaginal discharge (lochia) may last for 2 to 8 weeks, and can come and go for about 2 months.    Vaginal soreness, including pain, discomfort, and numbness, is common after vaginal birth. Soreness may be worse if you had a perineal tear or episiotomy.    If you had a  birth (), you may have pain in your lower abdomen and may need pain medicine for 1 to 2 weeks.    Breast engorgement is common around the 3rd or 4th day after delivery, when the breasts begin to fill with milk. This can cause discomfort and swelling. If you are breast feeding, the best treatment is to feed your baby often or pump the milk out. You can also use warm compresses. If you are not breast feeding, placing ice packs on your breasts, taking a hot shower, or using warm compresses may relieve the discomfort.    Be aware of postpartum depression    \"Baby blues\" are common for the first 1 to 2 weeks after birth. You may cry or feel sad or irritable for no reason.     For some women, these feelings last longer and are more intense. This is called postpartum depression.     If your symptoms last for more than a few weeks or you feel very depressed, ask your midwife or physician for help.     Postpartum " depression can be treated. Support groups and counseling can help, but sometimes medication is needed.     Discuss follow-up appointments with your midwife or physician, as well. He or she will usually want to see you 6 weeks after the birth of your baby, sooner if you are having problems.    If you have questions about any symptoms you are experiencing or any other concerns, call your provider or their clinic staff at [facility] at Phone: [phone]. If it's after clinic hours, physician patients should call the [facility] at [phone] ([phone]); midwife patients should call their answering service at [phone].    How can you care for yourself at home?   You can refer to the Starting Out Right book or find it online at http://www.healthScoop.[email or web address]/images/stories/http://www.healthScoop.[email or web address]/images/stories/maternity/[facility]-[email or web address]-Right.pdf or http://www.healthScoop.[email or web address]/images/stories/flipbooks/[facility]-starting-[facility]-right/[facility]-starting-out-right.html#p=8     You can sign up for a weekly parenting e-mail that gives support, tips and advice from health care professionals that starts with pregnancy and continues through the toddler years. To register, go to [email or web address] at any time during your pregnancy.    Making Early Breastfeeding or Chestfeeding Work: What's Important?  Breastfeeding/chestfeeding is important!     It helps keep babies healthy.    Parents who breastfeed/chestfeed have lower risks of breast and ovarian cancer.    It's convenient: the milk is always ready and warm, and there is nothing to mix or prepare for feeding.    Formula is harder for your baby to digest.    It helps you bond with your baby and protects against postpartum depression.  Lots of early skin-to-skin contact with your baby    Place your naked baby with baby's belly against your bare chest. Cover baby's back with a blanket    Start skin-to-skin right after birth, as soon as you  "are ready    Skin-to-skin:  ? Helps keep baby warm  ? Improves baby's oxygen and blood sugar levels  ? Helps your uterus contract and bleed less  ? Helps baby feel calm and comforted  ? Helps you feel close to baby  ? Helps get breastfeeding started. Being close makes latching on easier, and baby may move over to the nipple and latch without help  ? Baby breastfeeds better and longer when skin-to-skin  Placing baby well for good attachment to the breast or chest    Hold your baby close with baby's tummy touching your tummy.    Wait for baby to open mouth wide, then bring baby onto the breast/chest.    Baby should take a big mouthful of breast/chest, not just the nipple. This helps baby get more milk, and the suckling should feel comfortable.    When baby is latched well to the breast/chest, nipples aren't cracked or painful.  Keeping baby near (called \"rooming-in\" at the hospital)    Baby sleeps better and cries less when birth parent is near. Your room is quiet.    We will place your baby safely on their back in their bassinette. This lets you practice safe sleep for your baby while keeping them at your bedside.    Baby feeds more often, which means your milk supply increases faster, and your baby loses less weight.    Parents have an easier time getting to know and bonding with baby.    Parents feel much more confident about baby care and breastfeeding/chestfeeding.    Maternity staff can help at any time.  Feeding on cue    Feeding on cue simply means feeding whenever your baby shows signs of hunger    Crying is a late hunger sign. Feed baby whenever baby wants for as long as baby wants.    Feeding signs: mouth movements, sticking the tongue out, rooting (baby turns toward chest and may open mouth), hand-to-mouth movements    Advantages of feeding on cue:  ? Frequent breastfeeding/chestfeeding in the first weeks after birth gives you a good milk supply for months to come.  ? Babies settle into a relaxing feed " "more quickly. Babies enjoy feedings more when they don't have to cry to be fed.  ? Feeding is comfort as well as nutrition. Newborns love constant closeness and feeding and can't be held \"too much\" or \"spoiled.\"  ? Newborns need small frequent feedings in the first days of life. Just one to three teaspoons fill a new baby's stomach.  ? Responding to feeding cues helps babies gain weight.  Feeding only human milk in the first six months    Colostrum is the first milk that baby gets at birth. The amount of colostrum matches the baby's stomach, so it will not be overfilled.    The small volumes ready at birth are also easier for baby to handle.    All babies lose weight in the first few days. This is simply \"water weight.\"    Introducing food or fluids other than human milk too early can cause problems for breastfeeding/chestfeeding and for your baby's health.    Feeding only human milk maximizes the protection against disease and infections.    Your body knows how much milk to make by how often your baby feeds. If you give your baby formula, your body may not know how much milk to make.    For informational purposes only. Not to replace the advice of your health care provider. Adapted with permission from \"Getting Started with Infant Care and Breastfeeding,\" by KRISTIAN Francisco, ELIZ, Rizwana Palacios, RN, IBCLC, Valentina Mendoza MD, ELIZ, and Nida Cadet MD, IBCLC. [place] [employer], 2017. Clinically reviewed by [facility]. [employer] [id number] - 05/19.        [facility] Breastfeeding Resources       Research shows that human milk offers the best  nutrition and protection for babies. So at [facility],  we care for families and babies in a way that  promotes, teaches and supports lactation. We  support all breastfeeding, chestfeeding and human  milk feeding families, as well as families who can t  breastfeed / chestfeed or who choose not to do so.  A mother or caregiver s own milk is best for " a baby,  but if you are unable to breastfeed / chestfeed, we  may suggest pasteurized donor human milk for your  baby while in the hospital.    Lactation support    The following [facility] locations offer  individual outpatient lactation consults. Some  locations offer phone or group lactation consults  with certified lactation consultants. Call to confirm  services and for information and appointments. You  may wish to call your insurance company first to see  if they will cover the cost of a consult.    [facility]: [phone]    [facility]: [phone]    [facility]: [phone]  Offers [place] First Days program, which includes  group lactation visits and one free information session  about delivering your baby at a designated [facility] hospital and the importance and benefits  of breastfeeding and human milk. These group visits  also help patients with feeding concerns and offer  information about other postpartum topics.                For informational purposes only. Not to replace the advice of your health care  provider. Copyright   2005 [facility]. All rights reserved. [employer] [id number] - REV .   [facility] ([place]):  [phone]    [facility] ([place]):  [phone] or [phone]    [facility]):  [phone]    [facility]  ([place]): [phone]    [facility] ([place]):  [phone] or [phone]  Includes follow-up visits for caregivers of babies  discharged from the  intensive care unit  (NICU) at [facility].    [facility]):  [phone]    [facility] ([facility],  [facility], primary care clinics):  [phone]  Also offers weight checks, feeding discussions and support with a lactation  consultant as a part of free, weekly support group.    [facility]: [phone]  Also offers a free weekly support group.                                                                                                                                                                                                      (continued)   You may also call [facility] at [phone]  for information about [facility] and [facility]  locations that offer lactation support. For information  about breastfeeding / chestfeeding and childbirth  classes in the [facility], go to [facility] at www.[facility]. For [facility], go to [email or web address] and click on  Classes and Events at the bottom of the page. Or, call  [phone].    Supplies    You can get breast pumps from the [facility] nurses  at [facility] or [facility]  nurses at [facility]. Call your health  insurance to see if they will cover the cost of the  pump. Tell your nurse you d like a pump. They will  help you fill out the right paperwork. The pump will  be ready for you when you leave the hospital.    If you decide to get a pump after you leave the  hospital, you can get one from our partners at  [facility]. [facility] carries a range of  feeding supplies and pumps. Please call your health  insurance to see if they will cover the cost of the  pump. Then call [facility]  to find out what supplies and pumps are available.  Some stores may deliver the pump to your home.    [facility]:  [email or web address] Other lactation services    [facility]: [phone] (24 hours a day)  [email or web address]  Offers support for breastfeeding / chestfeeding and  human milk feeding families. Call to find a group in  your area.    National Women s Health Information  Center:  [phone]  www.womenshealth.gov/breastfeeding  Offers a breastfeeding / chestfeeding information  line in English and Norwegian.    WIC (Women, Infants and Children) Program:  [phone]  Offers breastfeeding / chestfeeding counseling. Call  to find an office near you.    Milk banking    [facility]  [email or web address]  [phone]  Consider freezing your extra collected milk to donate  to babies in need. Email or call for information.                           If you are deaf or hard of hearing, please let us know. We provide many free services including  sign language interpreters, oral interpreters, TTYs, telephone amplifiers, note takers and written materials.

## 2021-06-17 NOTE — PROGRESS NOTES
Subjective:    GDM  Diet-controlled.  Well-controlled.    OB ROS:   Headache: None.   Vision change: None.   Abnormal vaginal discharge: None.   Bleeding: None.   Fetal movement: Normal.     Objective:  Vitals:    21 1016   BP: 116/73   Pulse: 97   Resp: 20      Prepregnancy BMI: 22.83. Total weight gain: 9 lb (4.082 kg)   Exam: see flowsheet.  No results found for this or any previous visit (from the past 24 hour(s)).      Assessment/Plan:    30 y.o.  at 34w6d.    Due to prepregnancy BMI of 22.83, weight gain of 9 lb (4.082 kg) is insufficient. The following recommendations were made: continue current diet and activity.    Severe anemia - OK with TXA and transfusion.    GDM - well controlled, diet controlled.  Future Appointments   Date Time Provider Department Center   2021 11:30 AM Lis Uribe, Diabetes Ed [facility] DI[facility]ED [facility]   2021 11:00 AM Binta Gerard MD [facility]   2021 10:20 AM Binta Gerard MD [facility]   2021  8:40 AM Binta Gerard MD [facility]   2021 10:00 AM Binta Gerard MD [facility]   2021 10:00 AM Binta Gerard MD [facility]   2021 10:00 AM Binta Gerard MD [facility]   2021 10:00 AM Binta Gerard MD [facility]      Order Summary                                                      1. Supervision of other high risk pregnancies, third trimester        Completed by: Binta Gerard M.D., [facility]. 2021 10:20 AM.  15 minutes spent on the day of encounter doing chart review, history, exam, documentation, and review of Chanell's current health status and pregnancy.

## 2021-06-17 NOTE — PATIENT INSTRUCTIONS - HE
Patient Education   HEALTHY PREGNANCY CARE: 34-36 WEEKS PREGNANT    Your baby is gaining about an ounce each day, so healthy nutrition and rest are still very important. Learn about late pregnancy symptoms, such as constipation and backaches. Drinking more fluids and eating more fiber can relieve constipation. The pelvic tilt and a heating pad can ease backaches.    Continue to watch for signs and symptoms of preeclampsia:     Sudden swelling of your face, hands, or feet     New vision problems such as blurring, double vision, or flashing lights    A severe headache not relieved with acetaminophen (Tylenol)    Sharp or stabbing pain in your right or middle upper abdomen    You're almost done with your pregnancy but it's still too soon to have your baby. The goal is to have your baby after 37 weeks, so watch for signs and symptoms of premature labor:     Regular contractions. This means having about 6 or more within 1 hour, even after you have had a glass of water and are resting.     A backache that starts and stops regularly.    An increase or change in vaginal discharge, such as heavy, mucus-like, watery, or bloody discharge.     Your water breaks or leaks.    You will be tested for group B streptococcus (GBS), a type of bacteria found in 10-30% of pregnant women. A woman with GBS can pass it to her baby during delivery. Most babies who get GBS from their mothers do not have any problems, but some babies get very ill and need to be hospitalized for antibiotic treatment. Treating you with antibiotics during labor and delivery helps to prevent infection in your baby.    Review information on postpartum care that you will need after the baby is born.  Discuss your choices and plans for birth control with your midwife or physician.     Postpartum Care  During the days and weeks after the delivery of your baby (postpartum period), your body will change as it returns to its nonpregnant condition. As with pregnancy  "changes, postpartum changes are different for every woman.    Physical changes after childbirth  The changes in your body may include:    Contractions called afterpains shrink the uterus for several days after childbirth. Shrinking of the uterus to its prepregnancy size may take 6 to 8 weeks.    Sore muscles (especially in the arms, neck, or jaw) are common after childbirth. This is because of the hard work of labor and pushing your baby out. The soreness should go away in a few days.    Bleeding and vaginal discharge (lochia) may last for 2 to 8 weeks, and can come and go for about 2 months.    Vaginal soreness, including pain, discomfort, and numbness, is common after vaginal birth. Soreness may be worse if you had a perineal tear or episiotomy.    If you had a  birth (), you may have pain in your lower abdomen and may need pain medicine for 1 to 2 weeks.    Breast engorgement is common around the 3rd or 4th day after delivery, when the breasts begin to fill with milk. This can cause discomfort and swelling. If you are breast feeding, the best treatment is to feed your baby often or pump the milk out. You can also use warm compresses. If you are not breast feeding, placing ice packs on your breasts, taking a hot shower, or using warm compresses may relieve the discomfort.    Be aware of postpartum depression    \"Baby blues\" are common for the first 1 to 2 weeks after birth. You may cry or feel sad or irritable for no reason.     For some women, these feelings last longer and are more intense. This is called postpartum depression.     If your symptoms last for more than a few weeks or you feel very depressed, ask your midwife or physician for help.     Postpartum depression can be treated. Support groups and counseling can help, but sometimes medication is needed.     Discuss follow-up appointments with your midwife or physician, as well. He or she will usually want to see you 6 weeks after the " birth of your baby, sooner if you are having problems.    If you have questions about any symptoms you are experiencing or any other concerns, call your provider or their clinic staff at [facility] at Phone: [phone]. If it's after clinic hours, physician patients should call the [facility] at [phone] ([phone]); midwife patients should call their answering service at [phone].    How can you care for yourself at home?   You can refer to the Starting Out Right book or find it online at [email or web address]/[email or web address] or [email or web address]/images/[email or web address]-out-[email or web address]starting[facility].html#p=8     You can sign up for a weekly parenting e-mail that gives support, tips and advice from health care professionals that starts with pregnancy and continues through the toddler years. To register, go to [email or web address] at any time during your pregnancy.    Making Early Breastfeeding or Chestfeeding Work: What's Important?  Breastfeeding/chestfeeding is important!     It helps keep babies healthy.    Parents who breastfeed/chestfeed have lower risks of breast and ovarian cancer.    It's convenient: the milk is always ready and warm, and there is nothing to mix or prepare for feeding.    Formula is harder for your baby to digest.    It helps you bond with your baby and protects against postpartum depression.  Lots of early skin-to-skin contact with your baby    Place your naked baby with baby's belly against your bare chest. Cover baby's back with a blanket    Start skin-to-skin right after birth, as soon as you are ready    Skin-to-skin:  ? Helps keep baby warm  ? Improves baby's oxygen and blood sugar levels  ? Helps your uterus contract and bleed less  ? Helps baby feel calm and comforted  ? Helps you feel close to baby  ? Helps get  "breastfeeding started. Being close makes latching on easier, and baby may move over to the nipple and latch without help  ? Baby breastfeeds better and longer when skin-to-skin  Placing baby well for good attachment to the breast or chest    Hold your baby close with baby's tummy touching your tummy.    Wait for baby to open mouth wide, then bring baby onto the breast/chest.    Baby should take a big mouthful of breast/chest, not just the nipple. This helps baby get more milk, and the suckling should feel comfortable.    When baby is latched well to the breast/chest, nipples aren't cracked or painful.  Keeping baby near (called \"rooming-in\" at the hospital)    Baby sleeps better and cries less when birth parent is near. Your room is quiet.    We will place your baby safely on their back in their bassinette. This lets you practice safe sleep for your baby while keeping them at your bedside.    Baby feeds more often, which means your milk supply increases faster, and your baby loses less weight.    Parents have an easier time getting to know and bonding with baby.    Parents feel much more confident about baby care and breastfeeding/chestfeeding.    Maternity staff can help at any time.  Feeding on cue    Feeding on cue simply means feeding whenever your baby shows signs of hunger    Crying is a late hunger sign. Feed baby whenever baby wants for as long as baby wants.    Feeding signs: mouth movements, sticking the tongue out, rooting (baby turns toward chest and may open mouth), hand-to-mouth movements    Advantages of feeding on cue:  ? Frequent breastfeeding/chestfeeding in the first weeks after birth gives you a good milk supply for months to come.  ? Babies settle into a relaxing feed more quickly. Babies enjoy feedings more when they don't have to cry to be fed.  ? Feeding is comfort as well as nutrition. Newborns love constant closeness and feeding and can't be held \"too much\" or \"spoiled.\"  ? Newborns need " "small frequent feedings in the first days of life. Just one to three teaspoons fill a new baby's stomach.  ? Responding to feeding cues helps babies gain weight.  Feeding only human milk in the first six months    Colostrum is the first milk that baby gets at birth. The amount of colostrum matches the baby's stomach, so it will not be overfilled.    The small volumes ready at birth are also easier for baby to handle.    All babies lose weight in the first few days. This is simply \"water weight.\"    Introducing food or fluids other than human milk too early can cause problems for breastfeeding/chestfeeding and for your baby's health.    Feeding only human milk maximizes the protection against disease and infections.    Your body knows how much milk to make by how often your baby feeds. If you give your baby formula, your body may not know how much milk to make.    For informational purposes only. Not to replace the advice of your health care provider. Adapted with permission from \"Getting Started with Infant Care and Breastfeeding,\" by KRISTIAN Francisco, ELIZ, Rizwana Palacios, RN, IBCLC, Valentina Mendoza MD, ELIZ, and Nida Cadet MD, IBCLC. [place] [employer], 2017. Clinically reviewed by [facility]. [employer] 453848 - 05/19.        [facility] Resources       Research shows that human milk offers the best  nutrition and protection for babies. So at [facility],  we care for families and babies in a way that  promotes, teaches and supports lactation. We  support all breastfeeding, chestfeeding and human  milk feeding families, as well as families who can t  breastfeed / chestfeed or who choose not to do so.  A mother or caregiver s own milk is best for a baby,  but if you are unable to breastfeed / chestfeed, we  may suggest pasteurized donor human milk for your  baby while in the hospital.    Lactation support    The following [facility]-affiliated locations offer  individual " outpatient lactation consults. Some  locations offer phone or group lactation consults  with certified lactation consultants. Call to confirm  services and for information and appointments. You  may wish to call your insurance company first to see  if they will cover the cost of a consult.    [facility]: [phone]    [facility]: [phone]    [facility]: [phone]  [facility] program, which includes  group lactation visits and one free information session  about delivering your baby at a designated [facility] hospital and the importance and benefits  of breastfeeding and human milk. These group visits  also help patients with feeding concerns and offer  information about other postpartum topics.                For informational purposes only. Not to replace the advice of your health care  provider. Copyright   2005 [facility]. All rights reserved. [employer] 237284 - REV .   [facility] ([place]):  [phone]    [facility]):  [phone] or [phone]    [facility]):  [phone]    [facility]  ([place]): [phone]    [facility] ([place]):  [phone] or [phone]  Includes follow-up visits for caregivers of babies  discharged from the  intensive care unit  (NICU) at [facility].    [facility]):  [phone]    [facility] ([facility],  [facility], primary care clinics):  [phone]  Also offers weight checks, feeding discussions and support with a lactation consultant as a part of free, weekly support group.    [facility]: [phone]  Also offers a free weekly support group.                                                                                                                                                                                                       (continued)   You may also call [facility] at [phone]  for information about [facility] and [facility]  locations that offer lactation support. For information  about breastfeeding / chestfeeding and childbirth  classes in the [facility], go to [facility] at [email or web address]. For [facility], go to [email or web address] and click on  Classes and Events at the bottom of the page. Or, call  [phone].    Supplies    You can get breast pumps from the [facility] nurses  at [facility] or [facility]  nurses at [facility]. Call your health  insurance to see if they will cover the cost of the  pump. Tell your nurse you d like a pump. They will  help you fill out the right paperwork. The pump will  be ready for you when you leave the hospital.    If you decide to get a pump after you leave the  hospital, you can get one from our partners at  [facility]. [facility] [employer] carries a range of  feeding supplies and pumps. Please call your health  insurance to see if they will cover the cost of the  pump. Then call [facility]  to find out what supplies and pumps are available.  Some stores may deliver the pump to your home.    [facility]:  [email or web address] Other lactation services    [facility]: [phone] (24 hours a day)  [email or web address]  Offers support for breastfeeding / chestfeeding and  human milk feeding families. Call to find a group in  your area.    [facility]:  [phone]  www.womenshealth.gov/breastfeeding  Offers a breastfeeding / chestfeeding information  line in English and [place]ian.    WIC (Women, Infants and Children) Program:  [phone]  Offers breastfeeding / chestfeeding counseling. Call  to find an office near  you.    Milk banking    [facility]  [email or web address]  [phone]  Consider freezing your extra collected milk to donate  to babies in need. Email or call for information.                           If you are deaf or hard of hearing, please let us know. We provide many free services including  sign language interpreters, oral interpreters, TTYs, telephone amplifiers, note takers and written materials.

## 2021-06-17 NOTE — PATIENT INSTRUCTIONS - HE
Patient Education   HEALTHY PREGNANCY CARE: 37 to 41 WEEKS PREGNANT    Talk with your midwife or physician about when to call with signs of labor    Regular uterine contractions that are getting closer together and/or stronger    If you think your water has broken or is leaking    Bleeding from the vagina like a period (bloody vaginal discharge is normal)    If you are not feeling your baby move    Make plans for transportation and  as needed for when you are going to the hospital.    Your midwife or physician may offer to check your cervix for changes.     Ask your health care provider about vaccinations you may need following delivery. By now, you should have received a Tdap immunization to protect against pertussis or whooping cough. Fathers and family members who will be in close contact with the baby should also receive a Tdap shot at least two weeks before the expected birth of the baby if they have not had a Td (tetanus) shot for at least two years.    If you are past your due date, discuss the next steps leading to delivery with your midwife or physician. If you don't start labor on your own by 41 or 42 weeks, your midwife or physician may recommend giving you medicines to ripen your cervix and start labor.    Preparing for your baby: Tell your midwife or physician how you plan to feed your baby (breast or bottle), who you have chosen to do pediatric care for your baby, and if you have a boy, whether you have chosen to have him circumcised. You will need a car seat correctly installed in your vehicle to bring your baby home. As you start to set up the nursery at home for your baby, make sure the crib is safe. The mattress needs to fit snugly against the edges of the crib. If you can fit a soda can between the bars, they are too far apart and can allow the baby's head to caught between them.    Learn about infant care and feeding, including information about infant CPR. We recommend that you put  your baby to sleep on his or her back to reduce the chance of Sudden Infant Death Syndrome (SIDS). To maintain a healthy environment in which your child can grow, it's best to keep your home smoke-free. By preparing ahead, your transition into parenthood will go smoothly for you and your baby.    Your midwife or physician will want to see you for a checkup 2 to 6 weeks after delivery.    If you have questions about any symptoms you are experiencing or any other concerns, call your provider or their clinic staff at [facility] at Phone: [phone]. If it's after clinic hours, physician patients should call the Care Connection at [phone] ([phone]); midwife patients should call their answering service at [phone].    How can you care for yourself at home?   You can refer to the Starting Out Right book or find it online at [email or web address]/[email or web address]/[email or web address]-[email or web address] or [email or web address]/images/[email or web address]/[email or web address]-out-[email or web address]-starting-out-[email or web address]#p=[email or web address]     You can sign up for a weekly parenting e-mail that gives support, tips and advice from health care professionals that starts with pregnancy and continues through the toddler years. To register, go to [email or web address] at any time during your pregnancy.    Making Early Breastfeeding or Chestfeeding Work: What's Important?  Breastfeeding/chestfeeding is important!     It helps keep babies healthy.    Parents who breastfeed/chestfeed have lower risks of breast and ovarian cancer.    It's convenient: the milk is always ready and warm, and there is nothing to mix or prepare for feeding.    Formula is harder for your baby to digest.    It helps you bond with your baby and protects against postpartum depression.  Lots of early skin-to-skin contact with your baby    Place your naked baby with baby's belly against your bare chest. Cover baby's back with a  "blanket    Start skin-to-skin right after birth, as soon as you are ready    Skin-to-skin:  ? Helps keep baby warm  ? Improves baby's oxygen and blood sugar levels  ? Helps your uterus contract and bleed less  ? Helps baby feel calm and comforted  ? Helps you feel close to baby  ? Helps get breastfeeding started. Being close makes latching on easier, and baby may move over to the nipple and latch without help  ? Baby breastfeeds better and longer when skin-to-skin  Placing baby well for good attachment to the breast or chest    Hold your baby close with baby's tummy touching your tummy.    Wait for baby to open mouth wide, then bring baby onto the breast/chest.    Baby should take a big mouthful of breast/chest, not just the nipple. This helps baby get more milk, and the suckling should feel comfortable.    When baby is latched well to the breast/chest, nipples aren't cracked or painful.  Keeping baby near (called \"rooming-in\" at the hospital)    Baby sleeps better and cries less when birth parent is near. Your room is quiet.    We will place your baby safely on their back in their bassinette. This lets you practice safe sleep for your baby while keeping them at your bedside.    Baby feeds more often, which means your milk supply increases faster, and your baby loses less weight.    Parents have an easier time getting to know and bonding with baby.    Parents feel much more confident about baby care and breastfeeding/chestfeeding.    Maternity staff can help at any time.  Feeding on cue    Feeding on cue simply means feeding whenever your baby shows signs of hunger    Crying is a late hunger sign. Feed baby whenever baby wants for as long as baby wants.    Feeding signs: mouth movements, sticking the tongue out, rooting (baby turns toward chest and may open mouth), hand-to-mouth movements    Advantages of feeding on cue:  ? Frequent breastfeeding/chestfeeding in the first weeks after birth gives you a good milk " "supply for months to come.  ? Babies settle into a relaxing feed more quickly. Babies enjoy feedings more when they don't have to cry to be fed.  ? Feeding is comfort as well as nutrition. Newborns love constant closeness and feeding and can't be held \"too much\" or \"spoiled.\"  ? Newborns need small frequent feedings in the first days of life. Just one to three teaspoons fill a new baby's stomach.  ? Responding to feeding cues helps babies gain weight.  Feeding only human milk in the first six months    Colostrum is the first milk that baby gets at birth. The amount of colostrum matches the baby's stomach, so it will not be overfilled.    The small volumes ready at birth are also easier for baby to handle.    All babies lose weight in the first few days. This is simply \"water weight.\"    Introducing food or fluids other than human milk too early can cause problems for breastfeeding/chestfeeding and for your baby's health.    Feeding only human milk maximizes the protection against disease and infections.    Your body knows how much milk to make by how often your baby feeds. If you give your baby formula, your body may not know how much milk to make.    For informational purposes only. Not to replace the advice of your health care provider. Adapted with permission from \"Getting Started with Infant Care and Breastfeeding,\" by KRISTIAN Francisco, ELIZ, Rizwana Palacios, RN, IBCLC, Valentina Mendoza MD, ELIZ, and Nida Cadet MD, IBCLC. [place] [employer], 2017. Clinically reviewed by [facility]. [employer] 742737 - 05/19.        [facility] Breastfeeding Resources     Research shows that human milk offers the best  nutrition and protection for babies. So at [facility],  we care for families and babies in a way that  promotes, teaches and supports lactation. We  support all breastfeeding, chestfeeding and human  milk feeding families, as well as families who can t  breastfeed / chestfeed or who " choose not to do so.  A mother or caregiver s own milk is best for a baby,  but if you are unable to breastfeed / chestfeed, we  may suggest pasteurized donor human milk for your  baby while in the hospital.    Lactation support    The following [facility] locations offer  individual outpatient lactation consults. Some  locations offer phone or group lactation consults  with certified lactation consultants. Call to confirm  services and for information and appointments. You  may wish to call your insurance company first to see  if they will cover the cost of a consult.    [facility]: [phone]    [facility]: [phone]    [facility]: [phone]  Offers [place] First Days program, which includes  group lactation visits and one free information session  about delivering your baby at a designated [facility] hospital and the importance and benefits  of breastfeeding and human milk. These group visits  also help patients with feeding concerns and offer  information about other postpartum topics.                For informational purposes only. Not to replace the advice of your health care provider. Copyright   2005 [facility]. All rights reserved. [employer] 633745 - REV          [facility] ([place]):  [phone]    [facility] ([place]):  [phone] or [phone]    [facility]):  [phone]    [facility]  ([place]): [phone]    [facility] ([place]):  [phone] or [phone]  Includes follow-up visits for caregivers of babies  discharged from the  intensive care unit  (NICU) at [facility].    [facility]):  [phone]    [facility] ([facility],  [facility], primary care clinics):  [phone]  Also offers  weight checks, feeding discussions and support with a lactation consultant as a part of free, weekly support group.    [facility]: [phone]  Also offers a free weekly support group.                                                                                                                                                                                                      (continued)   You may also call [facility] at [phone]  for information about [facility] and [facility]  locations that offer lactation support. For information  about breastfeeding / chestfeeding and childbirth  classes in the [facility], go to [facility] at www.[facility] [employer]. For [facility], go to [email or web address] and click on  Classes and Events at the bottom of the page. Or, call  [phone].    Supplies    You can get breast pumps from the Birthplace nurses  at [facility] or [facility]  nurses at [facility]. Call your health  insurance to see if they will cover the cost of the  pump. Tell your nurse you d like a pump. They will  help you fill out the right paperwork. The pump will  be ready for you when you leave the hospital.    If you decide to get a pump after you leave the  hospital, you can get one from our partners at  [employer]. [employer] carries a range of  feeding supplies and pumps. Please call your health  insurance to see if they will cover the cost of the  pump. Then call [employer]  to find out what supplies and pumps are available.  Some stores may deliver the pump to your home.    [employer]:  [email or web address] Other lactation services    [facility]: [phone] (24 hours a day)  www.[email or web address]  Offers support for breastfeeding / chestfeeding and  human milk feeding families. Call to find a group in  your area.    National Women s Health  Information Center:  [phone]  www.womenshealth.gov/breastfeeding  Offers a breastfeeding / chestfeeding information  line in English and Syriac.    WIC (Women, Infants and Children) Program:  [phone]  Offers breastfeeding / chestfeeding counseling. Call  to find an office near you.    Milk banking    [facility]  [email or web address]  [phone]  Consider freezing your extra collected milk to donate  to babies in need. Email or call for information.                       If you are deaf or hard of hearing, please let us know. We provide many free services including  sign language interpreters, oral interpreters, TTYs, telephone amplifiers, note takers and written materials.

## 2021-06-17 NOTE — PROGRESS NOTES
Subjective:  Some contractions. Cramping is not often and not bad. Occasionally.  Taking iron pill.    OB ROS:   Headache: None.   Vision change: None.   Abnormal vaginal discharge: None.   Bleeding: None.   Fetal movement: Normal.     Objective:  Reviewed vitals. Exam - see flowsheet.   Recent Results (from the past 24 hour(s))   Urinalysis, OB Screen (ketones, glucose, protein)   Result Value Ref Range    Glucose, UA Negative Negative    Ketones, UA Negative Negative    Protein, UA Negative Negative mg/dL        Assessment/Plan:    27 y.o.  at 38w6d.    1. Encounter for supervision of other normal pregnancy in third trimester  Urinalysis, OB Screen (ketones, glucose, protein)      Total time: 15 minutes. Greater than 50% spent in counseling and coordination of care regarding topics, above.

## 2021-06-17 NOTE — PROGRESS NOTES
Subjective:  No problems.  Mild contractions about three times a day.    OB ROS:   Headache: None.   Vision change: None.   Abnormal vaginal discharge: None.   Bleeding: None.   Fetal movement: Normal.     Objective:  Reviewed vitals. Exam - see flowsheet.     Recent Results (from the past 24 hour(s))   Urinalysis, OB Screen (ketones, glucose, protein)   Result Value Ref Range    Glucose, UA Negative Negative    Ketones, UA Negative Negative    Protein, UA Negative Negative mg/dL        Assessment/Plan:    27 y.o.  at 33w6d.    Reviewed symptoms of  labor.  1. Encounter for supervision of other normal pregnancy in third trimester  Urinalysis, OB Screen (ketones, glucose, protein)      Total time: 15 minutes. Greater than 50% spent in counseling and coordination of care regarding topics, above.

## 2021-06-17 NOTE — PROGRESS NOTES
Subjective:  No concerns.  The patient states that she is taking and tolerating her iron pills.  However, hemoglobin went from 10.6-9.6.  Iron studies show worsening of iron deficiency    OB ROS:   Headache: None.   Vision change: None.   Abnormal vaginal discharge: None.   Bleeding: None.   Fetal movement: Normal.     Objective:  Reviewed vitals. Exam - see flowsheet. GBS obtained  Recent Results (from the past 24 hour(s))   Urinalysis, OB Screen (ketones, glucose, protein)   Result Value Ref Range    Glucose, UA Negative Negative    Ketones, UA Negative Negative    Protein, UA Trace (!) Negative mg/dL   Hemoglobin   Result Value Ref Range    Hemoglobin 9.9 (L) 12.0 - 16.0 g/dL   Iron and Transferrin Iron Binding Capacity   Result Value Ref Range    Iron 48 42 - 175 ug/dL    Transferrin 396 (H) 212 - 360 mg/dL    Transferrin Saturation, Calculated 10 (L) 20 - 50 %    Transferrin IBC, Calculated 494 313 - 563 ug/dL      Lab Results   Component Value Date    HGB 9.9 (L) 2018    HGB 9.6 (L) 2018    HGB 10.6 (L) 2018     Lab Results   Component Value Date    MCV 67 (L) 10/11/2017    MCV 67 (L) 2017    MCV 67 (L) 2015         Assessment/Plan:    27 y.o.  at 36w6d.    Anemia - check hemoglobin + iron studies.  Recommend continued iron use.    GBS obtained.  1. Encounter for supervision of other normal pregnancy in third trimester  Urinalysis, OB Screen (ketones, glucose, protein)    Group B Strep Screen by PCR   2. Beta thalassemia trait  Hemoglobin    Iron and Transferrin Iron Binding Capacity   3. Anemia  Hemoglobin    Iron and Transferrin Iron Binding Capacity      Total time: 15 minutes. Greater than 50% spent in counseling and coordination of care regarding topics, above.

## 2021-06-17 NOTE — PROGRESS NOTES
"Subjective:    : Nida     GDM - well controlled, diet controlled    The femur length is less than the second percentile. The head circumference is at the lower limits of normal. The biparietal diameter is at the 29th percentile. The abdominal circumference is at the 97th percentile. Mom 4'11\", Dad 5'3\". Midparental height: 4'10.5\" for baby.     OB ROS:   Headache: None.   Vision change: None.   Abnormal vaginal discharge: None.   Bleeding: None.   Fetal movement: Normal.     Objective:  Vitals:    21 1017   BP: 114/72   Pulse: 93   Resp: 16   SpO2: 99%      Prepregnancy BMI: 22.83. Total weight gain: 9 lb (4.082 kg)   Exam: see flowsheet.  Recent Results (from the past 24 hour(s))   Urinalysis, OB Screen (ketones, glucose, protein)   Result Value Ref Range    Glucose, UA Negative Negative    Ketones, UA Negative Negative    Protein, UA Negative Negative         Assessment/Plan:    30 y.o.  at 36w6d.    Due to prepregnancy BMI of 22.83, weight gain of 9 lb (4.082 kg) is insufficient. The following recommendations were made: continue current diet and activity.    GDM - diet controlled, well controlled. Excellent health behaviors.    Short femur length and high abdominal circumference. Discussed with Chanell. She has MFM US today.  Future Appointments   Date Time Provider Department Center   2021  2:00 PM Lis Uribe, Diabetes Ed [facility] DIABED [facility]   2021  8:40 AM Binta Gerard MD [facility]   2021 10:00 AM Binta Gerard MD [facility] OB [facility]   2021 10:00 AM Binta Gerard MD [facility]   2021 10:00 AM Binta Gerard MD [facility] OB [facility]   2021 10:00 AM Binta Gerard MD [facility]      Order Summary                                                      1. Encounter for supervision of other normal pregnancy in third trimester  Urinalysis, OB Screen (ketones, glucose, protein)    Hemoglobin    Group B Strep Screen " by PCR   2. Diet controlled gestational diabetes mellitus (GDM), antepartum     3. Vitamin deficiency  Vitamin D, Total (25-Hydroxy)      Completed by: Binta Gerard M.D., [facility]. 5/14/2021 10:41 AM.  15 minutes spent on the day of encounter doing chart review, history, exam, documentation, and review of Chanell's current health status and pregnancy.

## 2021-06-17 NOTE — PROGRESS NOTES
Chanell presents to [facility] for NST following BPP.   BPP 8/8 and EFW 2907.   Dr GRAZYNA Gerard was updated on NST and BPP results, including FHR decel at  1348.   Plan to follow up with additional NST in three days. Discussed triage discharge instructions with pt via [facility] .

## 2021-06-17 NOTE — TELEPHONE ENCOUNTER
[facility] received referral and scheduled patient for a level 2 ultrasound on 5/14 at [facility]. Referring provider notified. Removing referral.     Leah Madrid [facility] Scheduling

## 2021-06-17 NOTE — PROGRESS NOTES
Pt arrived for scheduled NST due to gestational diabetes. NST reactive with baseline 130 bpm, moderate variability and accels present, do decels.

## 2021-06-18 NOTE — PATIENT INSTRUCTIONS - HE
Patient Instructions by Binta Gerard MD at 2/22/2021 12:40 PM     Author: Binta Gerard MD Service: -- Author Type: Physician    Filed: 2/22/2021 10:08 AM Encounter Date: 2/22/2021 Status: Signed    : Binta Gerard MD (Physician)         Patient Education   HEALTHY PREGNANCY CARE: 22-26 WEEKS PREGNANT    You are finishing your second trimester. Your baby is developing rapidly. At this stage, babies have a sense of balance, can respond to touch, and are recognizing parent voices.  Your baby will be moving around more now.  You may notice [place]Perez contractions now, which are painless and prepare the uterus for the delivery.    Nutrition: During this time, you may find that your nausea and fatigue are gone. Overall, you may feel better and have more energy than you did in your first trimester. Be sure you are getting enough calcium and iron in your diet. Your prenatal vitamins cannot supply all of the nutrients you need, so continue to eat 3-4 servings of dairy foods and 2-3 servings of meat/fish/poultry/nuts every day. Foods high in iron include: red meats, eggs, dark green vegetables, dark yellow vegetables, nuts, kidney beans and chickpeas. Some cereals are fortified with iron, so look at the food labels for 100% of the daily requirement for iron.     Discuss your work situation with your midwife or physician as needed. If you stand for long periods of time, you may need to make changes and take breaks.    [place] for childbirth and parenting classes, including an infant CPR class. Breastfeeding classes are recommended too.    Plan for the gestational diabetes screening between weeks 24-28. You can eat normally before that visit; we would suggest making sure you have protein foods, but not a lot of carbohydrates or sugary foods.    Your blood type was determined at your first OB appointment. If you are Rh negative, you should discuss the need for a Rhogam shot with your midwife or physician.  "This would be administered around 28 weeks if necessary.    How can you care for yourself at home?   You can refer to the Starting Out Right book or find it online at [email or web address] or [email or web address]     You can sign up for a weekly parenting e-mail that gives support, tips and advice from health care professionals that starts with pregnancy and continues through the toddler years. To register, go to [email or web address] at any time during your pregnancy.    Watch for the warning signs of premature labor:   \" Dull, low backache  \" Contractions of the uterus, menstrual-like cramps  \" Abdominal cramping with or without diarrhea  \" More pelvic pressure  \" Increase or change in vaginal discharge.     Continue to watch for signs and symptoms of preeclampsia:   \" Sudden swelling of your face, hands, or feet   \" New vision problems such as blurring, double vision, or flashing lights  \" A severe headache not relieved with acetaminophen (Tylenol)  \" Sharp or stabbing pain in your right or middle upper abdomen        Remember that labor doesn't have to hurt. Never hesitate to call your midwife or physician or their staff at [facility] at Phone: [phone] for any one of these warning signs - or if something just doesn't feel right. If it's after clinic hours, physician patients should call the [facility] at [phone] ([phone]); midwife patients should call their answering service at [phone].    BREASTFEEDING TIPS FOR NEW MOMS     Importance of skin-to-skin contact:  ? Gets breastfeeding off to a good start  ? Keeps baby warm and is great for bonding  ? Provides calming effects and helps to stabilize breathing and  blood sugar  ? Helps the uterus to contract and bleed less    Importance of feeding whenever baby " "shows signs of  being hungry, \"feeding on cue\":  ? Helps create a good milk supply appropriate to the babys needs  ? Less breastfeeding complications such as engorgement or  low supply  ? Helps baby get enough milk  ? Milk supply is determined by how often baby nurses and empties  the breast.  ? Feeding is for comfort as well as nutrition    Importance of good latch (positioning and attaching  baby properly at breast):  ? Helps prevent sore nipples  ? Helps ensure baby gets enough milk and supports moms breast  milk supply    Risks of giving baby supplements other  than moms breastmilk  Breastfeeding alone is recommended for the first 6 months, if not it:  ? Can make baby more prone to illness  ? Can make baby less satisfied at breast (wanting larger amounts or  faster flow)  ? Reduces milk supply    Importance of rooming-in:  ? Increases parent confidence while mother is supported by the  hospital staff  ? Caregivers learn how to care for baby and recognize feeding cues  ? Enables feeding whenever baby needs to eat  ? Baby is comforted with mom and learns to recognize caregivers    Avoiding use of pacifiers:  ? Use of pacifiers in the first weeks can make it difficult to make a full  milk supply  ? May interfere with baby learning to latch well      2017 [facility] [id number]. SW [id number]. DOD 11.17         Breastfeeding   Why Its Important and What to Expect     Your breast milk is the best food for your baby--and  breastfeeding can help you be healthy as well.    Though breastfeeding is natural, it is a learned process for both mother and baby. To prepare, there are things you can learn--and do--before your baby is born.    ? Learn the benefits of breastfeeding.    ? Understand the basic process.    ? Know what to expect in the hospital.    ? Arrange breastfeeding support for the first few  weeks after birth.    ? Take a breastfeeding class (see back page).    ? Talk to your midwife, nurse or doctor if you " have  Questions.    The benefits of breastfeeding    Human milk changes to meet the needs of a growing baby. It is all a baby needs for the first six months of life.    In fact, babies who receive only human milk for the  first six months are less likely to develop colds, the  flu, colic, asthma, ear infections, food allergies and  diarrhea (loose, watery stools). They may be less likely      to be overweight as children, and they are less likely to develop diabetes later in life. Some studies also show that infants have a higher IQ if they are .    Breastfeeding can:    ? Help you and your baby develop a special bond--  and make you feel proud that you can feed your  Baby.    ? Reduce the total amount of blood you will lose  after delivery.    ? Help your uterus return to its non-pregnant size.    ? Reduce the risk of Sudden Infant Death Syndrome (SIDS).    ? Help you lose your pregnancy weight more quickly.    ? Help delay the return of your monthly periods.    ? Lower your risk of some breast and ovarian  cancers--as well as osteoporosis (bone loss)--later in life.    ? Save you more than $300 per month. (This  includes the cost of formula and medical bills.  Formula-fed babies get sick more often.)          If you are deaf or hard of hearing, please let us know. We provide many free services including  sign language interpreters, oral interpreters, TTYs, telephone amplifiers, note takers and written materials.        How to breastfeed    Skin-to-skin contact    Hold your baby on your chest skin-to-skin right after  birth. Skin contact calms your baby, steadies their  breathing and keeps your baby warm. Your baby will be alert and will likely want to feed within the first hour after birth.    Babies are born with reflexes that help them  breastfeed. Your body will be ready with early milk  (called colostrum), so you will have all the milk your  baby needs for that first feeding. Your nurse will help you  get started.    Keep your baby with you and breastfeed whenever  your baby is hungry. Offering the breast early and  often helps your body keep making lots of milk.    How to position your baby    There are many positions for breastfeeding.              No matter which position you choose, support your  babys back, shoulders and neck. The head and body should be in a straight line, and the entire body should face the breast. Your baby should be able to tilt the head back easily. Your baby shouldnt have to reach out to feed. Also make sure your babys nose is level with your nipple. This way, your baby will find it easier to attach to your breast.    Finally, get comfortable. Use pillows to support your  body. Dont lean over or slump to reach your baby.  Once your baby is attached to the breast, its okay to change your position slightly.    You will feel a bit of a tugging at first, but you should  never feel pain. If you do, ask your nurse or lactation expert for help. She will teach you how to latch your baby onto the breast in a way that feels more comfortable.    Breastfeeding in the hospital    For the first three days after birth, your body will  produce early milk called colostrum. This milk is  full of calories and antibodies to help keep your baby healthy. It is all your baby needs for the first few days.    Remember, babies do not eat anything while inside  you. Right after birth, your baby will only need a little bit of milk (about 1 teaspoon per feeding) to get the digestive system working well. Your baby will not need any formula--your body will make the right amount of milk.    Breastfeed whenever your baby shows signs of hunger. (See next page for a list of signs.) Crying is a late sign of hunger.    Babies often lose weight in the days after birth. This  is normal. By two weeks of age, your baby should be back to their birth weight. Your care team will watch your babys weight carefully.    If you are  unable to breastfeed in the hospital, your  care team may suggest pasteurized donor human milk for your baby.               The Most Important Points to Remember    ? Your breast milk is the perfect food for your  baby. Breastfeeding has a lot of health benefits  for you as well.    ? Hold your baby skin-to-skin as soon as possible  after birth. Do this for as long as you can. Even  if your baby doesnt go to the breast right away,  skin-to-skin contact helps your body make  more milk. This lets you get an early start on  Breastfeeding.    ? Learn how to position your baby at the breast.  This will help your baby feed well, and it will  keep you comfortable.    ? Feed your baby whenever your baby wants to  eat. You are feeding a baby, not a clock!    ? Signs that your baby is ready to eat include:  starting to wake up, chewing on fists, moving  the face from side to side, opening and closing  the mouth, sticking out the tongue and turning  toward the breast when held. Crying is a late  sign of hunger, so look for the earlier signals  that your baby makes.    ? Feed your baby only human milk for at least  six months. The World Health Organization  recommends breastfeeding for the first year,  noting it is a thony baby who is  for  the first two years.    ? While in the hospital, plan to keep your baby  with you at all times, except for certain medical  procedures. This is an important time for you  and your baby to get to know each other and  practice breastfeeding.     Common questions    Below are questions that many women have about  breastfeeding. You will find further information in the  childbirth book your care team gave you. If you have more questions, speak with your midwife, nurse or doctor.    How do I involve my partner, family and  friends and get their help and support?    Sometimes family and friends dont understand why  you want to breastfeed. Perhaps they themselves  didnt breastfeed, or they  werent  as infants. Tell them about the benefits of breastfeeding and how important it is to feed only human milk for the first six months or so. If you feed often, you will make plenty of milk to help your baby grow, fight illness and get the best start possible.    After two to four weeks--once your baby is feeding  well and your milk supply is well established--your  partner and others can feed the baby your milk from  a cup, dropper or bottle. You can remove milk from  your breast (by hand or pump) and store it for later  use. This way, your baby will have your milk even  when youre away.    Remind everyone that there are lots of ways to help  that dont involve feeding: making meals, caring for  your other children, comforting the baby if they cries, changing diapers, running errands and more.    Is breastfeeding painful?    Not usually. There are ways to prevent pain and to  treat it if it happens.    The best ways to avoid pain are to feed your baby  often, use a good position and correctly latch your  baby onto the breast. These help prevent the two  most common sources of pain: sore nipples and  engorgement (overly full breasts). You will learn more about these topics in a breastfeeding class and in the hospital after your baby is born.         How much time does it take to breastfeed?    Some new mothers feel that all they do is breastfeed. In the early weeks, a baby eats 8 to 12 times per day. Sometimes babies will cluster feedings close together. At other times, there are longer stretches between feedings.    Remember, your newborns stomach will be about  the size of a walnut. Your baby wont eat much at each feeding. If you feed your baby often in the first days, your baby--and your milk supply--will grow. Your baby will eat more at each session, and you will need to nurse less often. Within a few weeks, most women find that breastfeeding is easier and takes less time than formula feeding.    How  will I know if my baby is getting  enough milk?    Your body will start making milk as soon as your  baby is born. The more often you put your baby to  breast, the more milk you will make. You should avoid pacifiers for the first several weeks until breastfeeding is well established--you want your baby to do all their sucking at the breast. This will help you make more milk.    There are signs that your baby is getting enough milk. You will learn these signs over time. For example:    ? You will count wet and soiled diapers, because  these show how much milk your baby is getting.    ? Your baby will seem satisfied after feedings.    ? Your baby will grow and gain weight after the first  few days.    Are there reasons why a woman shouldnt  breastfeed her baby?    There are a few medical concerns that prevent  breastfeeding. In mothers, these include being HIVpositive, having active or untreated TB (tuberculosis)  and using street drugs or some medicines. These  mothers usually choose infant formula for their  Babies.    A few women choose not to breastfeed for personal  reasons. But most mothers can breastfeed. If you are not sure if its okay to breastfeed, ask your care team.    Getting support    Before your baby is born, get as much information  as you can. Sign up for a breastfeeding class. Most  childbirth classes discuss breastfeeding, but a special class will give more in-depth information.    ? In the [facility]: Go to [facility] [employer].    ? In [facility]: Go to [email or web address].  Click on Classes-and Events at the bottom of the  page, then search for breastfeeding. Or, call [phone].    Remember, help is available from your hospital, clinic, lactation experts or online. If you need help after leaving the hospital:    ? Call your babys clinic. (If you dont have a clinic,  call [phone] and ask for a referral.)    ? Call a lactation consultant. (If you need  help  finding a location that offers lactation support, call  [phone].)    ? Call [employer] (24 hours a  day) at [phone] [[phone]].    ? Call the Women, Infants and Children (WIC)  program at 1-402.360.8725.    ? Call the National Womens Health Information  Center (English and Sinhala) at 1-471.225.3299 or  go to www.womenshealth.gov/breastfeeding.    ? Go to [employer].       For informational purposes only. Not to replace the advice of your health care provider. Copyright   2010 [facility]. All rights reserved. Clinically reviewed by [facility]. [employer] [id number] - REV 06/18.

## 2021-06-18 NOTE — PATIENT INSTRUCTIONS - HE
Patient Instructions by Binta Gerard MD at 3/23/2021  3:00 PM     Author: Binta Gerard MD Service: -- Author Type: Physician    Filed: 3/23/2021  3:33 PM Encounter Date: 3/23/2021 Status: Signed    : Binta Gerard MD (Physician)         Patient Education   HEALTHY PREGNANCY CARE: 26-30 WEEKS PREGNANT    You are now in your last trimester of pregnancy. Your baby is growing rapidly, can open and close her eyelids, sometimes get hiccups, and you'll probably feel her moving around more often. Your baby has breathing movements and is gaining about one ounce each day. You may notice heartburn and leg cramps. Your back may ache as your body gets used to your baby's size and length.    Discuss your work situation with your midwife or physician as needed. If you stand for long periods of time, you may need to make changes and take breaks.    Pre-register online at the hospital where your baby will be born ([email or web address])     Be aware of your baby's activity level. You may be asked to do daily fetal movement counts. Contact your midwife or physician about any decreased movement.    You may have been tested for gestational diabetes today. If you are RH negative, you may have had an additional test and a Rhogam injection.    Consider receiving a Tdap vaccination to protect your baby from Pertussis/whooping cough.    If you are considering tubal ligation, discuss this with your midwife or physician. You will need to have an appointment with the obstetrician who will do the surgery to discuss the risks, benefits, and alternatives, and to sign the consent. This can be discussed at your next visit.    Continue to watch for any signs or symptoms of premature labor:     Regular contractions. This means having about 6 or more within 1 hour, even after you have had a glass of water and are resting.     A backache that starts and stops regularly.    An increase  "or change in vaginal discharge, such as heavy, mucus-like, watery, or bloody discharge.     Your water breaks or leaks.    Continue to watch for signs and symptoms of preeclampsia:     Sudden swelling of your face, hands, or feet     New vision problems such as blurring, double vision, or flashing lights    A severe headache not relieved with acetaminophen (Tylenol)    Sharp or stabbing pain in your right or middle upper abdomen    If you have any of the above symptoms or any other concerns, call your midwife or physician or their clinic staff at [facility] at Phone: [phone]. If it's after clinic hours, physician patients should call the [facility] at [phone] ([phone]); midwife patients should call their answering service at [phone].    How can you care for yourself at home?   You can refer to the Starting Out Right book or find it online at [email or web address] or [email or web address]     You can sign up for a weekly parenting e-mail that gives support, tips and advice from health care professionals that starts with pregnancy and continues through the toddler years. To register, go to [email or web address] at any time during your pregnancy.    BREASTFEEDING TIPS FOR NEW MOMS     Importance of skin-to-skin contact:  ? Gets breastfeeding off to a good start  ? Keeps baby warm and is great for bonding  ? Provides calming effects and helps to stabilize breathing and  blood sugar  ? Helps the uterus to contract and bleed less    Importance of feeding whenever baby shows signs of  being hungry, \"feeding on cue\":  ? Helps create a good milk supply appropriate to the babys needs  ? Less breastfeeding complications such as engorgement or  low supply  ? Helps baby get enough milk  ? Milk supply is determined by how often " baby nurses and empties  the breast.  ? Feeding is for comfort as well as nutrition    Importance of good latch (positioning and attaching  baby properly at breast):  ? Helps prevent sore nipples  ? Helps ensure baby gets enough milk and supports moms breast  milk supply    Risks of giving baby supplements other  than moms breastmilk  Breastfeeding alone is recommended for the first 6 months, if not it:  ? Can make baby more prone to illness  ? Can make baby less satisfied at breast (wanting larger amounts or  faster flow)  ? Reduces milk supply    Importance of rooming-in:  ? Increases parent confidence while mother is supported by the  hospital staff  ? Caregivers learn how to care for baby and recognize feeding cues  ? Enables feeding whenever baby needs to eat  ? Baby is comforted with mom and learns to recognize caregivers    Avoiding use of pacifiers:  ? Use of pacifiers in the first weeks can make it difficult to make a full  milk supply  ? May interfere with baby learning to latch well      2017 [facility] [place] [id number]. [place] [id number]. DOD 11.17           Breastfeeding   Why Its Important and What to Expect     Your breast milk is the best food for your baby--and  breastfeeding can help you be healthy as well.    Though breastfeeding is natural, it is a learned process for both mother and baby. To prepare, there are things you can learn--and do--before your baby is born.    ? Learn the benefits of breastfeeding.    ? Understand the basic process.    ? Know what to expect in the hospital.    ? Arrange breastfeeding support for the first few  weeks after birth.    ? Take a breastfeeding class (see back page).    ? Talk to your midwife, nurse or doctor if you have  Questions.    The benefits of breastfeeding    Human milk changes to meet the needs of a growing baby. It is all a baby needs for the first six months of life.    In fact, babies who receive only human milk for the  first six months are less likely to develop colds,  the  flu, colic, asthma, ear infections, food allergies and  diarrhea (loose, watery stools). They may be less likely      to be overweight as children, and they are less likely to develop diabetes later in life. Some studies also show that infants have a higher IQ if they are .    Breastfeeding can:    ? Help you and your baby develop a special bond--  and make you feel proud that you can feed your  Baby.    ? Reduce the total amount of blood you will lose  after delivery.    ? Help your uterus return to its non-pregnant size.    ? Reduce the risk of Sudden Infant Death Syndrome (SIDS).    ? Help you lose your pregnancy weight more quickly.    ? Help delay the return of your monthly periods.    ? Lower your risk of some breast and ovarian  cancers--as well as osteoporosis (bone loss)--later in life.    ? Save you more than $300 per month. (This  includes the cost of formula and medical bills.  Formula-fed babies get sick more often.)          If you are deaf or hard of hearing, please let us know. We provide many free services including  sign language interpreters, oral interpreters, TTYs, telephone amplifiers, note takers and written materials.        How to breastfeed    Skin-to-skin contact    Hold your baby on your chest skin-to-skin right after  birth. Skin contact calms your baby, steadies their  breathing and keeps your baby warm. Your baby will be alert and will likely want to feed within the first hour after birth.    Babies are born with reflexes that help them  breastfeed. Your body will be ready with early milk  (called colostrum), so you will have all the milk your  baby needs for that first feeding. Your nurse will help you get started.    Keep your baby with you and breastfeed whenever  your baby is hungry. Offering the breast early and  often helps your body keep making lots of milk.    How to position your baby    There are many positions for breastfeeding.              No matter which  position you choose, support your  babys back, shoulders and neck. The head and body should be in a straight line, and the entire body should face the breast. Your baby should be able to tilt the head back easily. Your baby shouldnt have to reach out to feed. Also make sure your babys nose is level with your nipple. This way, your baby will find it easier to attach to your breast.    Finally, get comfortable. Use pillows to support your  body. Dont lean over or slump to reach your baby.  Once your baby is attached to the breast, its okay to change your position slightly.    You will feel a bit of a tugging at first, but you should  never feel pain. If you do, ask your nurse or lactation expert for help. She will teach you how to latch your baby onto the breast in a way that feels more comfortable.    Breastfeeding in the hospital    For the first three days after birth, your body will  produce early milk called colostrum. This milk is  full of calories and antibodies to help keep your baby healthy. It is all your baby needs for the first few days.    Remember, babies do not eat anything while inside  you. Right after birth, your baby will only need a little bit of milk (about 1 teaspoon per feeding) to get the digestive system working well. Your baby will not need any formula--your body will make the right amount of milk.    Breastfeed whenever your baby shows signs of hunger. (See next page for a list of signs.) Crying is a late sign of hunger.    Babies often lose weight in the days after birth. This  is normal. By two weeks of age, your baby should be back to their birth weight. Your care team will watch your babys weight carefully.    If you are unable to breastfeed in the hospital, your  care team may suggest pasteurized donor human milk for your baby.             The Most Important Points to Remember    ? Your breast milk is the perfect food for your  baby. Breastfeeding has a lot of health benefits  for you  as well.    ? Hold your baby skin-to-skin as soon as possible  after birth. Do this for as long as you can. Even  if your baby doesnt go to the breast right away,  skin-to-skin contact helps your body make  more milk. This lets you get an early start on  Breastfeeding.    ? Learn how to position your baby at the breast.  This will help your baby feed well, and it will  keep you comfortable.    ? Feed your baby whenever your baby wants to  eat. You are feeding a baby, not a clock!    ? Signs that your baby is ready to eat include:  starting to wake up, chewing on fists, moving  the face from side to side, opening and closing  the mouth, sticking out the tongue and turning  toward the breast when held. Crying is a late  sign of hunger, so look for the earlier signals  that your baby makes.    ? Feed your baby only human milk for at least  six months. The World Health Organization  recommends breastfeeding for the first year,  noting it is a thony baby who is  for  the first two years.    ? While in the hospital, plan to keep your baby  with you at all times, except for certain medical  procedures. This is an important time for you  and your baby to get to know each other and  practice breastfeeding.     Common questions    Below are questions that many women have about  breastfeeding. You will find further information in the  childbirth book your care team gave you. If you have more questions, speak with your midwife, nurse or doctor.    How do I involve my partner, family and  friends and get their help and support?    Sometimes family and friends dont understand why  you want to breastfeed. Perhaps they themselves  didnt breastfeed, or they werent  as infants. Tell them about the benefits of breastfeeding and how important it is to feed only human milk for the first six months or so. If you feed often, you will make plenty of milk to help your baby grow, fight illness and get the best start  possible.    After two to four weeks--once your baby is feeding  well and your milk supply is well established--your  partner and others can feed the baby your milk from  a cup, dropper or bottle. You can remove milk from  your breast (by hand or pump) and store it for later  use. This way, your baby will have your milk even  when youre away.    Remind everyone that there are lots of ways to help  that dont involve feeding: making meals, caring for  your other children, comforting the baby if they cries, changing diapers, running errands and more.    Is breastfeeding painful?    Not usually. There are ways to prevent pain and to  treat it if it happens.    The best ways to avoid pain are to feed your baby  often, use a good position and correctly latch your  baby onto the breast. These help prevent the two  most common sources of pain: sore nipples and  engorgement (overly full breasts). You will learn more about these topics in a breastfeeding class and in the hospital after your baby is born.         How much time does it take to breastfeed?    Some new mothers feel that all they do is breastfeed. In the early weeks, a baby eats 8 to 12 times per day. Sometimes babies will cluster feedings close together. At other times, there are longer stretches between feedings.    Remember, your newborns stomach will be about  the size of a walnut. Your baby wont eat much at each feeding. If you feed your baby often in the first days, your baby--and your milk supply--will grow. Your baby will eat more at each session, and you will need to nurse less often. Within a few weeks, most women find that breastfeeding is easier and takes less time than formula feeding.    How will I know if my baby is getting  enough milk?    Your body will start making milk as soon as your  baby is born. The more often you put your baby to  breast, the more milk you will make. You should avoid pacifiers for the first several weeks until breastfeeding  is well established--you want your baby to do all their sucking at the breast. This will help you make more milk.    There are signs that your baby is getting enough milk. You will learn these signs over time. For example:    ? You will count wet and soiled diapers, because  these show how much milk your baby is getting.    ? Your baby will seem satisfied after feedings.    ? Your baby will grow and gain weight after the first  few days.    Are there reasons why a woman shouldnt  breastfeed her baby?    There are a few medical concerns that prevent  breastfeeding. In mothers, these include being HIVpositive, having active or untreated TB (tuberculosis)  and using street drugs or some medicines. These  mothers usually choose infant formula for their  Babies.    A few women choose not to breastfeed for personal  reasons. But most mothers can breastfeed. If you are not sure if its okay to breastfeed, ask your care team.    Getting support    Before your baby is born, get as much information  as you can. Sign up for a breastfeeding class. Most  childbirth classes discuss breastfeeding, but a special class will give more in-depth information.    ? In [facility]: Go to [facility]  Center at [employer].    ? In [facility]: Go to [email or web address].  Click on Classes-and Events at the bottom of the  page, then search for breastfeeding. Or, call [phone].    Remember, help is available from your hospital, clinic, lactation experts or online. If you need help after leaving the hospital:    ? Call your babys clinic. (If you dont have a clinic,  call [phone] and ask for a referral.)    ? Call a lactation consultant. (If you need help  finding a location that offers lactation support, call  [phone].)    ? Call [employer] Glad to Have You (24 hours a  day) at 5-223-MHOLED-T [1-849.854.5847].    ? Call the Women, Infants and Children (WIC)  program at 1-191.433.4837.    ? Call the  National Womens Health Information  Center (English and Serbian) at 1-453.386.5521 or  go to www.womenshealth.gov/breastfeeding.    ? Go to [employer].     For informational purposes only. Not to replace the advice of your health care provider. Copyright   2010 [employer]. All rights reserved. Clinically reviewed by [employer] [facility]. Pen[employer]ade 591208 - REV 06/18.

## 2021-06-19 NOTE — PROGRESS NOTES
Joao Lua is a 28 y.o. yo  who presents for a postpartum check.    Current Concerns    None    Contraception Plans  Withdrawal - would be OK if she got pregnant, though not really planning pregnancy.    Delivery  The patient delivered at [facility] on 18 via .   Labor was spontaneous and complicated by nothing.     The baby had the following  complications:  jaundice requiring phototherapy.   Her son, Kelvin Yee, is currently exclusively formula feeding.     Pregnancy:  Patient Active Problem List    Diagnosis Date Noted      (normal spontaneous vaginal delivery) 2018     Priority: High     Group B Streptococcus carrier, +RV culture, currently pregnant 2018     Priority: Medium     Echogenic intracardiac focus of fetus on prenatal ultrasound 2017     Priority: Medium     Overview Note:     NIPT 2018: 46XY       Anemia 10/12/2017     Priority: Medium     Overview Note:     Beta thal trait + iron deficiency. On oral iron supplement.       Normal pregnancy 10/11/2017     Priority: Medium     Overview Note:     27 y.o.   Baby's Gender:  BOY, Genetic screening: Progenity 46XY  : Sam Yee  Flu shot: 10/11/2017, Tdap: 3/22/18, Rubella immune, Varicella immune, Hepatitis B immune.  Employment: mother of 3 children          Abnormal glucose tolerance test (GTT) during pregnancy, antepartum 2018     Priority: Low     Overview Note:     1hr *. 3hr GTT 71, 163, 159*, 79.       Acne vulgaris 2017     Priority: Low     Beta thalassemia trait 2015     Priority: Low     Overview Note:     Baseline hgb 10.0  By hemoglobin electrophoresis.   Alpha thal trait not excluded.       Dental cavities 2015     Priority: Low     Language barrier 2015     Priority: Low     Overview Note:     Needs [facility] .         Objective     Blood pressure 96/60, pulse 68, resp. rate 16, weight 110 lb (49.9 kg), last  menstrual period 2018, unknown if currently breastfeeding. Body mass index is 21.85 kg/(m^2).  Heart: Regular rate and rhythm, no murmurs, rubs, or gallops.  Lungs: Clear to auscultation bilaterally.  No crackles.  Abdomen: Soft, nontender, bowel sounds normal.  No significant uterine tenderness.  Genitourinary: declined.  Extremities: Nontender, no significant edema.    Postpartum Depression Screen EPDS Total Score: 1 (2018  1:22 PM).  Item 10 (suicidal thoughts): Negative.  Interpretation Guide: Possible Depression = 10 or greater.       Assessment and Plan     28 y.o.  here today for postpartum check.  1. Postpartum evaluation of pregnancy problems: check hemoglobin + iron studies.  2. Pap smear up to date. 17 - NILM/HPV neg, no history of abnornal.  3. Contraception Plan: Withdrawal - discussed efficacy and folic acid for prevention of neural tube defects  4. Postpartum evaluation of chronic health conditions: check hemoglobin + iron studies.  5. Referrals: none.  6. Discussed ideal body weight. Prepregnancy 106 lb (BMI 21). Peak weigh 131 lb. Body mass index is 21.85 kg/(m^2).   7. Return to work  without restrictions after completion of routine maternity leave. 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  8. Next physical exam due in one year.

## 2021-06-21 NOTE — LETTER
Letter by Mel Flower PA-C at      Author: Mel Flower PA-C Service: -- Author Type: --    Filed:  Encounter Date: 11/4/2020 Status: (Other)         11/04/20    To Whom It May Concern:    Chanell Her was seen today at [facility] for Pregnancy Confirmation.    She is 9w4d.    Due Date: Estimated Date of Delivery: 6/5/21     Thank you.    Mel Flower PA-C

## 2021-06-25 NOTE — PROGRESS NOTES
Subjective:   No problems.    OB ROS:   Headache: None.   Vision change: None.   Abnormal vaginal discharge: None.   Bleeding: None.   Fetal movement: Normal.     Objective:  Vitals:    21 0947   BP: 114/73   Pulse: 99   Resp: 18      Prepregnancy BMI: 22.83. Total weight gain: 13 lb (5.897 kg)   Exam: see flowsheet.  Recent Results (from the past 24 hour(s))   Urinalysis, OB Screen (ketones, glucose, protein)   Result Value Ref Range    Glucose, UA Negative Negative    Ketones, UA Negative Negative    Protein, UA Negative Negative         Assessment/Plan:    30 y.o.  at 38w6d.    Future Appointments   Date Time Provider Department Center   2021 10:00 AM Binta Gerard MD [facility]   2021 10:00 AM Binta Gerard MD [facility]   2021 10:00 AM Binta Gerard MD [facility]      Order Summary                                                      1. Supervision of other high risk pregnancies, third trimester  Urinalysis, OB Screen (ketones, glucose, protein)      Completed by: Binta Gerard M.D., [facility]. 2021 10:07 AM.  15 minutes spent on the day of encounter doing chart review, history, exam, documentation, and review of Chanell's current health status and pregnancy.

## 2021-06-26 NOTE — PATIENT INSTRUCTIONS - HE
Patient Education   HEALTHY PREGNANCY CARE: 37 to 41 WEEKS PREGNANT    Talk with your midwife or physician about when to call with signs of labor    Regular uterine contractions that are getting closer together and/or stronger    If you think your water has broken or is leaking    Bleeding from the vagina like a period (bloody vaginal discharge is normal)    If you are not feeling your baby move    Make plans for transportation and  as needed for when you are going to the hospital.    Your midwife or physician may offer to check your cervix for changes.     Ask your health care provider about vaccinations you may need following delivery. By now, you should have received a Tdap immunization to protect against pertussis or whooping cough. Fathers and family members who will be in close contact with the baby should also receive a Tdap shot at least two weeks before the expected birth of the baby if they have not had a Td (tetanus) shot for at least two years.    If you are past your due date, discuss the next steps leading to delivery with your midwife or physician. If you don't start labor on your own by 41 or 42 weeks, your midwife or physician may recommend giving you medicines to ripen your cervix and start labor.    Preparing for your baby: Tell your midwife or physician how you plan to feed your baby (breast or bottle), who you have chosen to do pediatric care for your baby, and if you have a boy, whether you have chosen to have him circumcised. You will need a car seat correctly installed in your vehicle to bring your baby home. As you start to set up the nursery at home for your baby, make sure the crib is safe. The mattress needs to fit snugly against the edges of the crib. If you can fit a soda can between the bars, they are too far apart and can allow the baby's head to caught between them.    Learn about infant care and feeding, including information about infant CPR. We recommend that you put  your baby to sleep on his or her back to reduce the chance of Sudden Infant Death Syndrome (SIDS). To maintain a healthy environment in which your child can grow, it's best to keep your home smoke-free. By preparing ahead, your transition into parenthood will go smoothly for you and your baby.    Your midwife or physician will want to see you for a checkup 2 to 6 weeks after delivery.    If you have questions about any symptoms you are experiencing or any other concerns, call your provider or their clinic staff at [facility] at Phone: [phone]. If it's after clinic hours, physician patients should call the [facility] at [phone] ([phone]); midwife patients should call their answering service at [phone].    How can you care for yourself at home?   You can refer to the Starting Out Right book or find it online at [email or web address]-[email or web address] or [email or web address]/[email or web address]#p=[email or web address]     You can sign up for a weekly parenting e-mail that gives support, tips and advice from health care professionals that starts with pregnancy and continues through the toddler years. To register, go to [email or web address] at any time during your pregnancy.    Making Early Breastfeeding or Chestfeeding Work: What's Important?  Breastfeeding/chestfeeding is important!     It helps keep babies healthy.    Parents who breastfeed/chestfeed have lower risks of breast and ovarian cancer.    It's convenient: the milk is always ready and warm, and there is nothing to mix or prepare for feeding.    Formula is harder for your baby to digest.    It helps you bond with your baby and protects against postpartum depression.  Lots of early skin-to-skin contact with your baby    Place your naked baby with baby's belly against your bare chest. Cover baby's back with a  "blanket    Start skin-to-skin right after birth, as soon as you are ready    Skin-to-skin:  ? Helps keep baby warm  ? Improves baby's oxygen and blood sugar levels  ? Helps your uterus contract and bleed less  ? Helps baby feel calm and comforted  ? Helps you feel close to baby  ? Helps get breastfeeding started. Being close makes latching on easier, and baby may move over to the nipple and latch without help  ? Baby breastfeeds better and longer when skin-to-skin  Placing baby well for good attachment to the breast or chest    Hold your baby close with baby's tummy touching your tummy.    Wait for baby to open mouth wide, then bring baby onto the breast/chest.    Baby should take a big mouthful of breast/chest, not just the nipple. This helps baby get more milk, and the suckling should feel comfortable.    When baby is latched well to the breast/chest, nipples aren't cracked or painful.  Keeping baby near (called \"rooming-in\" at the hospital)    Baby sleeps better and cries less when birth parent is near. Your room is quiet.    We will place your baby safely on their back in their bassinette. This lets you practice safe sleep for your baby while keeping them at your bedside.    Baby feeds more often, which means your milk supply increases faster, and your baby loses less weight.    Parents have an easier time getting to know and bonding with baby.    Parents feel much more confident about baby care and breastfeeding/chestfeeding.    Maternity staff can help at any time.  Feeding on cue    Feeding on cue simply means feeding whenever your baby shows signs of hunger    Crying is a late hunger sign. Feed baby whenever baby wants for as long as baby wants.    Feeding signs: mouth movements, sticking the tongue out, rooting (baby turns toward chest and may open mouth), hand-to-mouth movements    Advantages of feeding on cue:  ? Frequent breastfeeding/chestfeeding in the first weeks after birth gives you a good milk " "supply for months to come.  ? Babies settle into a relaxing feed more quickly. Babies enjoy feedings more when they don't have to cry to be fed.  ? Feeding is comfort as well as nutrition. Newborns love constant closeness and feeding and can't be held \"too much\" or \"spoiled.\"  ? Newborns need small frequent feedings in the first days of life. Just one to three teaspoons fill a new baby's stomach.  ? Responding to feeding cues helps babies gain weight.  Feeding only human milk in the first six months    Colostrum is the first milk that baby gets at birth. The amount of colostrum matches the baby's stomach, so it will not be overfilled.    The small volumes ready at birth are also easier for baby to handle.    All babies lose weight in the first few days. This is simply \"water weight.\"    Introducing food or fluids other than human milk too early can cause problems for breastfeeding/chestfeeding and for your baby's health.    Feeding only human milk maximizes the protection against disease and infections.    Your body knows how much milk to make by how often your baby feeds. If you give your baby formula, your body may not know how much milk to make.    For informational purposes only. Not to replace the advice of your health care provider. Adapted with permission from \"Getting Started with Infant Care and Breastfeeding,\" by KRISTIAN Francisco, ELIZ, Rizwana Palacios, RN, IBCLC, Valentina Mendoza MD, ELIZ, and Nida Cadet MD, IBCLC. [place] [employer], 2017. Clinically reviewed by [facility]. [employer] 734247 - 05/19.        [facility] Breastfeeding Resources     Research shows that human milk offers the best  nutrition and protection for babies. So at [facility],  we care for families and babies in a way that  promotes, teaches and supports lactation. We  support all breastfeeding, chestfeeding and human  milk feeding families, as well as families who can t  breastfeed / chestfeed or who " choose not to do so.  A mother or caregiver s own milk is best for a baby,  but if you are unable to breastfeed / chestfeed, we  may suggest pasteurized donor human milk for your  baby while in the hospital.    Lactation support    The following [facility] locations offer  individual outpatient lactation consults. Some  locations offer phone or group lactation consults  with certified lactation consultants. Call to confirm  services and for information and appointments. You  may wish to call your insurance company first to see  if they will cover the cost of a consult.    [facility]: [phone]    [facility]: [phone]    [facility]: [phone]  Offers [place] First Days program, which includes  group lactation visits and one free information session  about delivering your baby at a designated [facility] hospital and the importance and benefits  of breastfeeding and human milk. These group visits  also help patients with feeding concerns and offer  information about other postpartum topics.                For informational purposes only. Not to replace the advice of your health care provider. Copyright   2005 [facility]. All rights reserved. [employer] 773067 - REV          [facility] ([place]):  [phone]    [facility] ([place]):  [phone] or [phone]    [facility]):  [phone]    [facility]  ([place]): [phone]    [facility] ([place]):  [phone] or [phone]  Includes follow-up visits for caregivers of babies  discharged from the  intensive care unit  (NICU) at [facility].    [facility]):  [phone]    [facility] ([facility],  [facility], primary care clinics):  [phone]  Also offers  weight checks, feeding discussions and support with a lactation consultant as a part of free, weekly support group.    [facility]: [phone]  Also offers a free weekly support group.                                                                                                                                                                                                      (continued)   You may also call [facility] at [phone]  for information about [facility] and [facility]  locations that offer lactation support. For information  about breastfeeding / chestfeeding and childbirth  classes in the [facility], go to [facility] at www.[email or web address]. For [facility], go to [email or web address] and click on  Classes and Events at the bottom of the page. Or, call  [phone].    Supplies    You can get breast pumps from the Birthplace nurses  at [facility] or [facility]  nurses at [facility]. Call your health  insurance to see if they will cover the cost of the  pump. Tell your nurse you d like a pump. They will  help you fill out the right paperwork. The pump will  be ready for you when you leave the hospital.    If you decide to get a pump after you leave the  hospital, you can get one from our partners at  [facility]. [facility] carries a range of  feeding supplies and pumps. Please call your health  insurance to see if they will cover the cost of the  pump. Then call [facility]  to find out what supplies and pumps are available.  Some stores may deliver the pump to your home.    [facility]:  [email or web address] Other lactation services    [facility]: [phone] (24 hours a day)  www.[email or web address].org  Offers support for breastfeeding / chestfeeding and  human milk feeding families. Call to find a group in  your area.    National Women s Health  Information Center:  [phone]  www.womenshealth.gov/breastfeeding  Offers a breastfeeding / chestfeeding information  line in English and Turkish.    WIC (Women, Infants and Children) Program:  [phone]  Offers breastfeeding / chestfeeding counseling. Call  to find an office near you.    Milk banking    [facility]  [email or web address]  [phone]  Consider freezing your extra collected milk to donate  to babies in need. Email or call for information.                       If you are deaf or hard of hearing, please let us know. We provide many free services including  sign language interpreters, oral interpreters, TTYs, telephone amplifiers, note takers and written materials.

## 2021-06-30 NOTE — PROGRESS NOTES
Progress Notes by Kelly Perry, Diabetes Ed at 3/5/2021  1:00 PM     Author: Kelly Perry, Diabetes Ed Service: -- Author Type: Diabetes Ed    Filed: 3/5/2021  2:04 PM Encounter Date: 3/5/2021 Status: Attested    : Kelly Perry, Diabetes Ed (Diabetes Ed) Cosigner: Binta Gerard MD at 3/7/2021 11:23 AM    Attestation signed by Binta Gerard MD at 3/7/2021 11:23 AM    Agree with recommendations.                Type of Service: Telephone Visit    Patient would like to receive their AVS by AVS Preference: MyChart.     ++ unable to do video visit - pt was not able to log in    [facility]ILKE GDM Care Plan    Assessment: Chanell is here today for her initial education for Gestational Diabetes.  This is her 6th pregnancy.  GRACIELA:6/5/21  Has 4 boys ages 2-10. It's a girl this time.  No hx of GDM in prior pregnancies. Denies any family hx of diabetes.  Pt can read/understand basic english and also numbers     SMBG:  Pt's  has picked up the meter + testing supplies  We reviewed meter and testing basics - pt feels comfortable with the meter use  Encouraged pt to call with any questions/concerns     2 meals/d typically, consisting of rice, veg, meat/chicken  Denies any sweets/suagry drinks. Drinks     Activity: walks daily x 30 mins     Reviewed pathophysiology, GDM basics, possible complications, meal planning/diet and exercise guidelines, meter and testing guidelines, checking glucose, as well as glucose goals.     All additional questions and concerns addressed today.     Plan:   1. Continue to test blood sugar 4 times per day   2. Keep a BS and food log for review.  3. Continue to drink a lot of water to stay hydrated   4. Eat 3 meals + 3 snacks as able; include adequate amounts of CHO and protein  5. Eat a bedtime snack (with protein + CHO)  6. Stay active after meals as able and safe to help manage blood sugars.  7. Call with 3 high BS in any one area in a 7 -Day period.  8. F/u on 3/15  9.Ob/gyn: 3/23  10. Pt  education/GDM resources were emailed to the pt  11. Ketone testing: to be discussed at the f/u visit, didn't want to overwhelm pt     Provider: Dr. Gerard   Provider's Diagnosis (per referral form): Gestational (648.83)    Weight:    OGTT:   Results for orders placed or performed in visit on 02/26/21   Glucose, Gestational Challenge 2 Hour   Result Value Ref Range    Glucose, 2 Hour 169 (H) 60-<155 mg/dL   Glucose, Gestational Challenge 1 Hour   Result Value Ref Range    Glucose, 1 Hour 195 (H) 60-<180 mg/dL   Glucose, Gestational Challenge Fasting   Result Value Ref Range    Glucose, Fasting 72 60-<95 mg/dL    Patient Fasting > 8hrs? Yes    Glucose, Gestational Challenge 3 Hour   Result Value Ref Range    Glucose, 3 hour 97 60-<140 mg/dL     EDC: Estimated Date of Delivery: 6/5/21   Pregnancy #: 6  Previous GDM: No  Medications:   Current Outpatient Medications:   ?  alcohol swabs PadM, Apply 1 each topically daily., Disp: 100 each, Rfl: 3  ?  blood glucose test strips, Use 1 each As Directed daily. Dispense brand per patient's insurance at pharmacy discretion., Disp: 100 strip, Rfl: 3  ?  docusate sodium (COLACE) 100 MG capsule, Take 1 capsule (100 mg total) by mouth daily., Disp: 50 capsule, Rfl: 0  ?  ferrous gluconate (FERGON) 324 MG tablet, Take 1 tablet (324 mg total) by mouth daily with breakfast., Disp: 100 tablet, Rfl: 3  ?  lancets 32 gauge Misc, Use 1 each As Directed daily., Disp: 100 each, Rfl: 3  ?  prenatal vitamin iron-folic acid 27mg-0.8mg (PRENATAL S) 27 mg iron- 800 mcg Tab tablet, Take 1 tablet by mouth daily., Disp: 100 tablet, Rfl: 2  ?  VITAMIN D3 50 mcg (2,000 unit) capsule, , Disp: , Rfl:   PNV: yes   Meter: rs was sent in  BG monitoring goals: Fasting <95; 1 hour post start of meal <140. Test 4 x per day.  Check fasting a.m. ketones: Yes  GDM meal pattern/carb counting taught per guidelines: Yes  Goals: Nutrition: GDM meal plan  Activity:  Walking after meals when able/staying  active  Monitoring:  BG 4x/day as directed, ketones every morning    F/u in 1 week to assess BG and food log     DIABETES CARE PLAN AND EDUCATION RECORD    Gestational Diabetes Disease Process/Preconception Care/Management During Pregnancy/Postpartum:    Meter (per above goals): Discussed, Literature provided and Patient returned demonstration    Nutrition Management    Weight: Assessed, Discussed and Literature provided  Portions/Balance: Assessed, Discussed and Literature provided  Carb ID/Count: Assessed, Discussed and Literature provided  Label Reading: Assessed, Discussed and Literature provided  Menu Planning: Assessed, Discussed and Literature provided  Dining Out: Assessed, Discussed and Literature provided  Physical Activity: Discussed and Literature provided    Acute Complications: Prevent, Detect, Treat:    Goal Setting and Problem Solving: Assessed and Discussed  Barriers: Assessed and Discussed  Psychosocial Adjustments: Assessed and Discussed      Time: 60 minutes   Visit Type: GDM   Visit #: 1

## 2021-06-30 NOTE — PROGRESS NOTES
Progress Notes by Kelly Perry, Diabetes Ed at 4/21/2021  1:00 PM     Author: Kelly Perry, Diabetes Ed Service: -- Author Type: Diabetes Ed    Filed: 4/21/2021  1:31 PM Encounter Date: 4/21/2021 Status: Attested    : Kelly Perry Diabetes Ed (Diabetes Ed) Cosigner: Ibis Arroyo MD at 4/22/2021  2:13 PM    Attestation signed by Ibis Arroyo MD at 4/22/2021  2:13 PM    Reviewed sugars and plan and agree with note as outlined.     Ibis Arroyo MD                  Type of Service: Telephone Visit    Patient would like to receive their AVS by AVS Preference: MyChart.       [facility] GDM Care Plan    Assessment:   F/u GDM visit, conducted with the help of a professional .  GRACIELA:6/5/21     Chanell has been testing BS 3x/d and all her BG readings have been at goal.   Ketones:negative    She has been testing only 3x/d since her insurance would not cover for the recommended 4x/d    Has continued to follow diet and activity guidelines. Drinks plenty of water   No other concerns today.    SMBG: BS log for the past 7-8 days:        PLAN:  1. Check blood sugar 3 times per day (before breakfast and 1 hour after any 2 meals)  2. Regular activity: aim for 30 mins daily/as able. Staying active for 10-15 mins after meals helps manage post prandial blood glucose better.   3. Continue to check urine for ketones once per day in the morning.  4.  Increase water intake to stay hydrated. Dehydration can raise blood sugar levels.  5. Call if 3 high readings in a 7-day period or > trace ketones  6. Diabetes education f/u:5/4  7. Ob/gyn: 4/30      Visit Type: GDM Individual Follow-up  Time: 30 mins   Visit #: 5  Provider: Dr. Gerard   Provider's Diagnosis (per referral form): Gestational (648.83)  Weight:    OGTT:   Results for orders placed or performed in visit on 02/26/21   Glucose, Gestational Challenge 2 Hour   Result Value Ref Range    Glucose, 2 Hour 169 (H) 60-<155 mg/dL   Glucose, Gestational Challenge 1 Hour    Result Value Ref Range    Glucose, 1 Hour 195 (H) 60-<180 mg/dL   Glucose, Gestational Challenge Fasting   Result Value Ref Range    Glucose, Fasting 72 60-<95 mg/dL    Patient Fasting > 8hrs? Yes    Glucose, Gestational Challenge 3 Hour   Result Value Ref Range    Glucose, 3 hour 97 60-<140 mg/dL      Estimated Date of Delivery: 6/5/21   Pregnancy #: 6  Previous GDM: No   Medications:   Current Outpatient Medications:   ?  acetone, urine, test Strp, Test once a day, Disp: 100 strip, Rfl: 0  ?  alcohol swabs PadM, Apply 1 each topically daily., Disp: 100 each, Rfl: 3  ?  blood glucose test (CONTOUR NEXT TEST STRIPS) strips, Use 1 each As Directed 4 (four) times a day., Disp: 100 strip, Rfl: 6  ?  docusate sodium (COLACE) 100 MG capsule, Take 1 capsule (100 mg total) by mouth daily., Disp: 50 capsule, Rfl: 0  ?  ferrous gluconate (FERGON) 324 MG tablet, Take 1 tablet (324 mg total) by mouth daily with breakfast., Disp: 100 tablet, Rfl: 3  ?  generic lancets, Use 1 each As Directed 4 (four) times a day., Disp: 100 each, Rfl: 6  ?  prenatal vitamin iron-folic acid 27mg-0.8mg (PRENATAL S) 27 mg iron- 800 mcg Tab tablet, Take 1 tablet by mouth daily., Disp: 100 tablet, Rfl: 2  ?  VITAMIN D3 50 mcg (2,000 unit) capsule, , Disp: , Rfl:     BG monitoring goals: Fasting <95; 1 hour post start of meal <140. Test 4 x per day.  Check fasting a.m. ketones: Yes  GDM meal pattern/carb counting taught per guidelines: Yes    Past Goals:  Nutrition: GDM meal plan yes   Activity: Walking after meals when able/staying active yes   Monitoring: BG 4x/day as directed, ketones every morning yes       New Goals:  Nutrition: GDM meal plan   Activity: Walking after meals when able/staying active   Monitoring: BG 4x/day as directed, ketones every morning    DIABETES CARE PLAN AND EDUCATION RECORD    Gestational Diabetes Disease Process/Preconception Care/Management During Pregnancy/Postpartum:Discussed    Meter (per above goals): Assessed  and Discussed    Nutrition Management    Weight: Assessed and Discussed  Portions/Balance: Assessed and Discussed  Carb ID/Count: Assessed and Discussed  Label Reading: Assessed and Discussed  Menu Planning: Assessed and Discussed  Dining Out: Assessed and Discussed  Physical Activity: Assessed and Discussed    Acute Complications: Prevent, Detect, Treat:    Goal Setting and Problem Solving: Assessed and Discussed  Barriers: Assessed and Discussed  Psychosocial Adjustments: Assessed and Discussed

## 2021-06-30 NOTE — PROGRESS NOTES
Progress Notes by Kelly Perry, Diabetes Ed at 4/12/2021  1:30 PM     Author: Kelly Perry, Diabetes Ed Service: -- Author Type: Diabetes Ed    Filed: 4/12/2021  3:01 PM Encounter Date: 4/12/2021 Status: Attested    : Kelly Perry, Diabetes Ed (Diabetes Ed) Cosigner: Binta Gerard MD at 4/12/2021  3:02 PM    Attestation signed by Binta Gerard MD at 4/12/2021  3:02 PM    Noted.                Type of Service: Telephone Visit    Patient would like to receive their AVS by AVS Preference: MyChart.       Assessment:   F/u GDM visit, conducted with the help of a professional .  GRACIELA:6/5/21    Chanell continues to do very well with testing BS 4x/d and all her BG readings have been at goal.   Ketones:negative  Pt states that she is almost out of test strips and lancets and the pharmacy won't fill it due to coverage issues.  I called [facility] later and spoke to      Reports that she eats 3 small meals and a couple of small snacks daily/most days.   Adequate water intake     Continues with regular walks - also tries to get in a few mins after meals.      Reviewed BS testing and goals, GDM diet and activity guidelines. All other questions and concerns were addressed today.              PLAN:  1. Check blood sugar 4 times per day (before breakfast and 1 hour after each meal)  2. Regular activity: aim for 30 mins daily/as able. Staying active for 10-15 mins after meals helps manage post prandial blood glucose better.   3. Continue to check urine for ketones once per day in the morning.  4.  Increase water intake to stay hydrated. Dehydration can raise blood sugar levels.  5. Call if 3 high readings in a 7-day period or > trace ketones  6. Diabetes education f/u:  7. Ob/gyn:pt will     Visit Type: GDM Individual Follow-up  Time: 30 mins  Visit #: 4  Provider: Dr. Gerard  Provider's Diagnosis (per referral form): Gestational (648.83)  Weight:    OGTT:   Results for orders placed or performed in visit on  02/26/21   Glucose, Gestational Challenge 2 Hour   Result Value Ref Range    Glucose, 2 Hour 169 (H) 60-<155 mg/dL   Glucose, Gestational Challenge 1 Hour   Result Value Ref Range    Glucose, 1 Hour 195 (H) 60-<180 mg/dL   Glucose, Gestational Challenge Fasting   Result Value Ref Range    Glucose, Fasting 72 60-<95 mg/dL    Patient Fasting > 8hrs? Yes    Glucose, Gestational Challenge 3 Hour   Result Value Ref Range    Glucose, 3 hour 97 60-<140 mg/dL      Estimated Date of Delivery: 6/5/21   Pregnancy #: 6  Previous GDM: No   Medications:   Current Outpatient Medications:   ?  acetone, urine, test Strp, Test once a day, Disp: 100 strip, Rfl: 0  ?  alcohol swabs PadM, Apply 1 each topically daily., Disp: 100 each, Rfl: 3  ?  blood glucose test strips, Use 1 each As Directed daily. Dispense brand per patient's insurance at pharmacy discretion., Disp: 100 strip, Rfl: 3  ?  docusate sodium (COLACE) 100 MG capsule, Take 1 capsule (100 mg total) by mouth daily., Disp: 50 capsule, Rfl: 0  ?  ferrous gluconate (FERGON) 324 MG tablet, Take 1 tablet (324 mg total) by mouth daily with breakfast., Disp: 100 tablet, Rfl: 3  ?  lancets 32 gauge Misc, Use 1 each As Directed daily., Disp: 100 each, Rfl: 3  ?  prenatal vitamin iron-folic acid 27mg-0.8mg (PRENATAL S) 27 mg iron- 800 mcg Tab tablet, Take 1 tablet by mouth daily., Disp: 100 tablet, Rfl: 2  ?  VITAMIN D3 50 mcg (2,000 unit) capsule, , Disp: , Rfl:     BG monitoring goals: Fasting <95; 1 hour post start of meal <140. Test 4 x per day.  Check fasting a.m. ketones: Yes  GDM meal pattern/carb counting taught per guidelines: Yes    Past Goals:  Nutrition: GDM meal plan yes   Activity: Walking after meals when able/staying active yes   Monitoring: BG 4x/day as directed, ketones every morning yes       New Goals:  Nutrition: GDM meal plan   Activity: Walking after meals when able/staying active   Monitoring: BG 4x/day as directed, ketones every morning    DIABETES CARE PLAN  AND EDUCATION RECORD    Gestational Diabetes Disease Process/Preconception Care/Management During Pregnancy/Postpartum:Discussed    Meter (per above goals): Assessed and Discussed    Nutrition Management    Weight: Assessed and Discussed  Portions/Balance: Assessed and Discussed  Carb ID/Count: Assessed and Discussed  Label Reading: Assessed and Discussed  Menu Planning: Assessed and Discussed  Dining Out: Assessed and Discussed  Physical Activity: Assessed and Discussed    Acute Complications: Prevent, Detect, Treat:    Goal Setting and Problem Solving: Assessed and Discussed  Barriers: Assessed and Discussed  Psychosocial Adjustments: Assessed and Discussed

## 2021-06-30 NOTE — PROGRESS NOTES
Progress Notes by Kelly Perry, Diabetes Ed at 4/12/2021  1:30 PM     Author: Kelly Perry, Diabetes Ed Service: -- Author Type: Diabetes Ed    Filed: 4/12/2021  4:48 PM Encounter Date: 4/12/2021 Status: Attested    : Kelly Perry, Diabetes Ed (Diabetes Ed) Cosigner: Binta Gerard MD at 4/13/2021  6:08 PM    Attestation signed by Binta Gerard MD at 4/13/2021  6:08 PM    See response.                  Testing supplies:    Pt stated that she is almost out of her testing supplies and that pharmacy won't fill her rx.  I called [facility] later and spoke to Po. An updated rx for test strips + lancets (to test 3x/d) was sent in. Pt will stop by to  today/tomorrow.  Since the recommendation for GDM patients is to test 4x/d, we will need to send in the Rx with PA for that - I will communicate with Dr. Gerard     Encouraged pt to test 3x/d until the PA would go through: Fasting BG and 2 post meal BG/d.  Instructed her to call at [phone] if she ran into issues.    Ob/gyn f/u: pt states, ob/gyn schedulers are going to call her for her f/u appt and that she doesn't have one scheduled yet.

## 2021-06-30 NOTE — PROGRESS NOTES
Progress Notes by Kelly Perry, Diabetes Ed at 3/29/2021  1:00 PM     Author: Kelly Perry, Diabetes Ed Service: -- Author Type: Diabetes Ed    Filed: 3/29/2021  8:27 PM Encounter Date: 3/29/2021 Status: Attested    : Kelly Perry, Diabetes Ed (Diabetes Ed) Cosigner: Binta Gerard MD at 3/30/2021  3:43 PM    Attestation signed by Binta Gerard MD at 3/30/2021  3:43 PM    Noted.                Type of Service: Telephone Visit    Patient would like to receive their AVS by AVS Preference: MyChart.     [facility] GDM Care Plan    Assessment:   F/u GDM visit, conducted with the help of a professional .  GRACIELA:6/5/21    Chanell has continued to do very well with testing BS 4x/d and all her BG readings have been at goal.   Ketones:negative     Reports that she eats 3 small meals and a couple of small snacks daily/most days.   Adequate water intake     Continues with regular walks - also tries to get in a few mins after meals.      Reviewed BS testing and goals, GDM diet and activity guidelines. All other questions and concerns were addressed today.    SMBG data:               PLAN:  1. Check blood sugar 4 times per day (before breakfast and 1 hour after each meal)  2. Regular activity: aim for 30 mins daily/as able. Staying active for 10-15 mins after meals helps manage post prandial blood glucose better.   3. Continue to check urine for ketones once per day in the morning.  4.  Increase water intake to stay hydrated. Dehydration can raise blood sugar levels.  5. Call if 3 high readings in a 7-day period or > trace ketones  6. Diabetes education f/u:4/12     Visit Type: GDM Individual Follow-up  Time: 30 mins  Visit #: 3  Provider: Dr. Gerard  Provider's Diagnosis (per referral form): Gestational (648.83)  Weight:    OGTT:   Results for orders placed or performed in visit on 02/26/21   Glucose, Gestational Challenge 2 Hour   Result Value Ref Range    Glucose, 2 Hour 169 (H) 60-<155 mg/dL   Glucose, Gestational  Challenge 1 Hour   Result Value Ref Range    Glucose, 1 Hour 195 (H) 60-<180 mg/dL   Glucose, Gestational Challenge Fasting   Result Value Ref Range    Glucose, Fasting 72 60-<95 mg/dL    Patient Fasting > 8hrs? Yes    Glucose, Gestational Challenge 3 Hour   Result Value Ref Range    Glucose, 3 hour 97 60-<140 mg/dL      Estimated Date of Delivery: 6/5/21   Pregnancy #: 6  Previous GDM: No  Medications:   Current Outpatient Medications:   ?  acetone, urine, test Strp, Test once a day, Disp: 100 strip, Rfl: 0  ?  alcohol swabs PadM, Apply 1 each topically daily., Disp: 100 each, Rfl: 3  ?  blood glucose test strips, Use 1 each As Directed daily. Dispense brand per patient's insurance at pharmacy discretion., Disp: 100 strip, Rfl: 3  ?  docusate sodium (COLACE) 100 MG capsule, Take 1 capsule (100 mg total) by mouth daily., Disp: 50 capsule, Rfl: 0  ?  ferrous gluconate (FERGON) 324 MG tablet, Take 1 tablet (324 mg total) by mouth daily with breakfast., Disp: 100 tablet, Rfl: 3  ?  lancets 32 gauge Misc, Use 1 each As Directed daily., Disp: 100 each, Rfl: 3  ?  prenatal vitamin iron-folic acid 27mg-0.8mg (PRENATAL S) 27 mg iron- 800 mcg Tab tablet, Take 1 tablet by mouth daily., Disp: 100 tablet, Rfl: 2  ?  VITAMIN D3 50 mcg (2,000 unit) capsule, , Disp: , Rfl:     BG monitoring goals: Fasting <95; 1 hour post start of meal <140. Test 4 x per day.  Check fasting a.m. ketones: Yes  GDM meal pattern/carb counting taught per guidelines: Yes    Past Goals:  Nutrition: GDM meal plan yes   Activity: Walking after meals when able/staying active yes   Monitoring: BG 4x/day as directed, ketones every morning yes       New Goals:  Nutrition: GDM meal plan   Activity: Walking after meals when able/staying active   Monitoring: BG 4x/day as directed, ketones every morning    DIABETES CARE PLAN AND EDUCATION RECORD    Gestational Diabetes Disease Process/Preconception Care/Management During Pregnancy/Postpartum:Discussed    Meter  (per above goals): Assessed and Discussed    Nutrition Management    Weight: Assessed and Discussed  Portions/Balance: Assessed and Discussed  Carb ID/Count: Assessed and Discussed  Label Reading: Assessed and Discussed  Menu Planning: Assessed and Discussed  Dining Out: Assessed and Discussed  Physical Activity: Assessed and Discussed    Acute Complications: Prevent, Detect, Treat:    Goal Setting and Problem Solving: Assessed and Discussed  Barriers: Assessed and Discussed  Psychosocial Adjustments: Assessed and Discussed

## 2021-06-30 NOTE — PROGRESS NOTES
Progress Notes by Kelly Perry, Diabetes Ed at 3/15/2021  9:00 AM     Author: Kelly Perry, Diabetes Ed Service: -- Author Type: Diabetes Ed    Filed: 3/15/2021  9:58 AM Encounter Date: 3/15/2021 Status: Attested    : Kelly Perry, Diabetes Ed (Diabetes Ed) Cosigner: Binta Gerard MD at 3/15/2021 12:43 PM    Attestation signed by Binta Gerard MD at 3/15/2021 12:43 PM    Noted.                Type of Service: Telephone Visit    Patient would like to receive their AVS by AVS Preference: MyChart.       [facility] GDM Care Plan    Assessment:   F/u appointment for gestational diabetes, conducted with the help of a professional .  GRACIELA:6/5/21  Chanell has been doing very well with testing BS 4x/d and all her BG readings have been at goal.    Pt reports that she eats 3 small meals and 2-3 small snacks daily most days.   Adequate water intake    She has been walking almost daily - also tries to get in a few mins after meals.     Education/discussion: ketones and ketone testing, BS testing and goals.    All other questions and concerns were addressed today.       SMBG:              Plan:   1. Continue to test blood sugar 4 times per day and daily ketones  2. Keep a BS and food log for review.  3. Continue to drink a lot of water to stay hydrated and dilute ketones  4. Eat 3 meals + 3 snacks as able; include adequate amounts of CHO and protein  5. Eat a bedtime snack (with protein + CHO)  6. Stay active after meals as able and safe to help manage blood sugars.  7. Call with 3 high BS in any one area/higher than trace ketones in a 7 -Day period.  8. F/u on 3/29  9.Ob/gyn: 3/23      Visit Type: GDM Individual Follow-up  Time: 30 mins   Visit #: 2  Provider: Dr. Gerard  Provider's Diagnosis (per referral form): Gestational (648.83)  Weight:    OGTT:   Results for orders placed or performed in visit on 02/26/21   Glucose, Gestational Challenge 2 Hour   Result Value Ref Range    Glucose, 2 Hour 169 (H) 60-<155 mg/dL    Glucose, Gestational Challenge 1 Hour   Result Value Ref Range    Glucose, 1 Hour 195 (H) 60-<180 mg/dL   Glucose, Gestational Challenge Fasting   Result Value Ref Range    Glucose, Fasting 72 60-<95 mg/dL    Patient Fasting > 8hrs? Yes    Glucose, Gestational Challenge 3 Hour   Result Value Ref Range    Glucose, 3 hour 97 60-<140 mg/dL      Estimated Date of Delivery: 6/5/21   Pregnancy #: 6  Previous GDM: No   Medications:   Current Outpatient Medications:   ?  alcohol swabs PadM, Apply 1 each topically daily., Disp: 100 each, Rfl: 3  ?  blood glucose test strips, Use 1 each As Directed daily. Dispense brand per patient's insurance at pharmacy discretion., Disp: 100 strip, Rfl: 3  ?  docusate sodium (COLACE) 100 MG capsule, Take 1 capsule (100 mg total) by mouth daily., Disp: 50 capsule, Rfl: 0  ?  ferrous gluconate (FERGON) 324 MG tablet, Take 1 tablet (324 mg total) by mouth daily with breakfast., Disp: 100 tablet, Rfl: 3  ?  lancets 32 gauge Misc, Use 1 each As Directed daily., Disp: 100 each, Rfl: 3  ?  prenatal vitamin iron-folic acid 27mg-0.8mg (PRENATAL S) 27 mg iron- 800 mcg Tab tablet, Take 1 tablet by mouth daily., Disp: 100 tablet, Rfl: 2  ?  VITAMIN D3 50 mcg (2,000 unit) capsule, , Disp: , Rfl:     BG monitoring goals: Fasting <95; 1 hour post start of meal <140. Test 4 x per day.  Check fasting a.m. ketones: No  GDM meal pattern/carb counting taught per guidelines: Yes    Past Goals:  Nutrition: GDM meal plan yes   Activity: Walking after meals when able/staying active yes   Monitoring: BG 4x/day as directed, ketones every morning checking BG      New Goals:  Nutrition: GDM meal plan   Activity: Walking after meals when able/staying active   Monitoring: BG 4x/day as directed, ketones every morning    DIABETES CARE PLAN AND EDUCATION RECORD    Gestational Diabetes Disease Process/Preconception Care/Management During Pregnancy/Postpartum:Discussed    Meter (per above goals): Assessed and  Discussed    Nutrition Management    Weight: Assessed and Discussed  Portions/Balance: Assessed and Discussed  Carb ID/Count: Assessed and Discussed  Label Reading: Assessed and Discussed  Menu Planning: Assessed and Discussed  Dining Out: Assessed and Discussed  Physical Activity: Assessed and Discussed    Acute Complications: Prevent, Detect, Treat:    Goal Setting and Problem Solving: Assessed and Discussed  Barriers: Assessed and Discussed  Psychosocial Adjustments: Assessed and Discussed

## 2021-07-03 NOTE — ADDENDUM NOTE
Addendum Note by Binta Gerard MD at 2/8/2018  3:27 PM     Author: Binta Gerard MD Service: -- Author Type: Physician    Filed: 2/8/2018  3:27 PM Encounter Date: 2/8/2018 Status: Signed    : Binta Gerard MD (Physician)    Addended by: BINTA GERARD on: 2/8/2018 03:27 PM        Modules accepted: Orders

## 2021-07-03 NOTE — ADDENDUM NOTE
Addendum Note by Binta Gerard MD at 10/14/2019 10:40 AM     Author: Binta Gerard MD Service: -- Author Type: Physician    Filed: 10/15/2019 11:19 AM Encounter Date: 10/14/2019 Status: Signed    : Binta Gerard MD (Physician)    Addended by: BINTA GERARD on: 10/15/2019 11:19 AM        Modules accepted: Orders

## 2021-07-04 NOTE — ADDENDUM NOTE
Addendum Note by Binta Gerard MD at 2/22/2021 12:40 PM     Author: Binta Gerard MD Service: -- Author Type: Physician    Filed: 2/22/2021  2:46 PM Encounter Date: 2/22/2021 Status: Signed    : Binta Gerard MD (Physician)    Addended by: BINTA GERARD on: 2/22/2021 02:46 PM        Modules accepted: Orders

## 2021-07-06 VITALS
HEART RATE: 99 BPM | RESPIRATION RATE: 18 BRPM | BODY MASS INDEX: 25.43 KG/M2 | SYSTOLIC BLOOD PRESSURE: 114 MMHG | DIASTOLIC BLOOD PRESSURE: 73 MMHG | WEIGHT: 127 LBS

## 2021-07-06 VITALS
WEIGHT: 128 LBS | DIASTOLIC BLOOD PRESSURE: 67 MMHG | BODY MASS INDEX: 25.64 KG/M2 | HEART RATE: 84 BPM | SYSTOLIC BLOOD PRESSURE: 104 MMHG

## 2021-07-19 ENCOUNTER — PRENATAL OFFICE VISIT (OUTPATIENT)
Dept: FAMILY MEDICINE | Facility: CLINIC | Age: 31
End: 2021-07-19
Payer: COMMERCIAL

## 2021-07-19 VITALS
BODY MASS INDEX: 21.41 KG/M2 | SYSTOLIC BLOOD PRESSURE: 108 MMHG | HEART RATE: 66 BPM | WEIGHT: 106 LBS | DIASTOLIC BLOOD PRESSURE: 72 MMHG

## 2021-07-19 DIAGNOSIS — Z12.4 SCREENING FOR MALIGNANT NEOPLASM OF CERVIX: ICD-10-CM

## 2021-07-19 DIAGNOSIS — O24.410 DIET CONTROLLED GESTATIONAL DIABETES MELLITUS (GDM), ANTEPARTUM: ICD-10-CM

## 2021-07-19 DIAGNOSIS — D56.3 BETA THALASSEMIA TRAIT: ICD-10-CM

## 2021-07-19 LAB
FASTING STATUS PATIENT QL REPORTED: NORMAL
GLUCOSE BLD-MCNC: 76 MG/DL (ref 70–125)
HBA1C MFR BLD: 5 % (ref 0–5.6)
HGB BLD-MCNC: 10.6 G/DL (ref 11.7–15.7)
IRON SATN MFR SERPL: 40 % (ref 20–50)
IRON SERPL-MCNC: 93 UG/DL (ref 42–175)
TIBC SERPL-MCNC: 235 UG/DL (ref 313–563)
TRANSFERRIN SERPL-MCNC: 188 MG/DL (ref 212–360)

## 2021-07-19 PROCEDURE — 85018 HEMOGLOBIN: CPT | Performed by: FAMILY MEDICINE

## 2021-07-19 PROCEDURE — 87624 HPV HI-RISK TYP POOLED RSLT: CPT | Performed by: FAMILY MEDICINE

## 2021-07-19 PROCEDURE — 84466 ASSAY OF TRANSFERRIN: CPT | Performed by: FAMILY MEDICINE

## 2021-07-19 PROCEDURE — 99207 PR POST PARTUM EXAM: CPT | Performed by: FAMILY MEDICINE

## 2021-07-19 PROCEDURE — 83540 ASSAY OF IRON: CPT | Performed by: FAMILY MEDICINE

## 2021-07-19 PROCEDURE — 83036 HEMOGLOBIN GLYCOSYLATED A1C: CPT | Performed by: FAMILY MEDICINE

## 2021-07-19 PROCEDURE — 82947 ASSAY GLUCOSE BLOOD QUANT: CPT | Performed by: FAMILY MEDICINE

## 2021-07-19 PROCEDURE — 36415 COLL VENOUS BLD VENIPUNCTURE: CPT | Performed by: FAMILY MEDICINE

## 2021-07-19 PROCEDURE — G0123 SCREEN CERV/VAG THIN LAYER: HCPCS | Performed by: FAMILY MEDICINE

## 2021-07-19 RX ORDER — FERROUS GLUCONATE 324(38)MG
324 TABLET ORAL
COMMUNITY
Start: 2021-02-22 | End: 2023-10-09

## 2021-07-19 NOTE — PROGRESS NOTES
"Postpartum Visit  [facility]  Date of Service: 2021    Jaoo Lua is a 31 year old yo  who presents for a postpartum check.    Current Concerns  ***    Contraception Plans  {Blank:::\"Planning pregnancy\",\"Vasectomy\",\"Condoms\",\"Postpartum Tubal Ligation\",\"Depo Provera\",\"Nexplanon\",\"Nuva Ring\",\"IUD\",\"Patch\",\"OCP\",\"Withdrawal - discussed efficacy and folic acid for prevention of neural tube defects\"}.    Delivery  The patient delivered at {Blank:::\"[facility]\",\"[facility]\"} on *** via {Blank:::\"repeat  for ***\",\"primary LTCS for ***\",\"vacuum-assisted vaginal delivery\",\"\"}.   Labor was {Blank:::\"induced for ***\",\"augmented with ***\",\"spontaneous\"} and complicated by {Blank:::\"postpartum hemorrhage\",\"nothing\"}.     The baby had the following  complications:  {Blank:::\"jaundice\",\"none\"}.   Her {Blank:::\"son\",\"daughter\"}, ***, is currently {Blank:::\"exclusive breast feeding\",\"receiving both breast and bottle\",\"exclusively formula feeding\"}.     Pregnancy:  Patient Active Problem List    Diagnosis Date Noted     Pregnancy 2020     Priority: High     30 y.o.    Baby's Gender: GIRL.  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          GBS (group B Streptococcus carrier), +RV culture, currently pregnant 2021     Priority: Medium     GBS positive 5/14/21         Abnormal  ultrasound 2021     Priority: Medium     5/10/21: FL<2%, HC low normal. BPD 29%. AC 97%.         Diet controlled gestational diabetes mellitus (GDM), antepartum 2021     Priority: Medium     High 1hr GCT. Normal 3hr GTT.         Vitamin deficiency 10/28/2019     Priority: Medium     Vit D <10         Beta thalassemia trait 2015     Priority: Medium     " "Baseline hgb 10.0  By hemoglobin electrophoresis.   Alpha thal trait not excluded.         Dental cavities 2015     Priority: Medium     Language barrier 2015     Priority: Medium     Needs ong .             Objective     Last menstrual period 2020. There is no height or weight on file to calculate BMI.  Prepregnancy weight: Pregravid weight not on file (BMI: Could not be calculated).  Wt Readings from Last 3 Encounters:   21 57.6 kg (127 lb)   21 58.1 kg (128 lb)   21 55.8 kg (123 lb)     Heart: Regular rate and rhythm, no murmurs, rubs, or gallops.  Lungs: Clear to auscultation bilaterally.  No crackles.  Abdomen: Soft, nontender, bowel sounds normal.  No significant uterine tenderness.  Genitourinary: {Blank:::\" Vulva, vagina, and cervix are normal in appearance.  Uterus is 8 weeks in size.  No adnexal fullness.  No excessive tenderness.\",\"declined\"}.  Extremities: Nontender, no significant edema.    Postpartum Depression Screen [unfilled].  Item 10 (suicidal thoughts): {Blank:::\"Positive\",\"Negative\"}.  Interpretation Guide: Possible Depression = 10 or greater.       Assessment and Plan     31 year old  here today for postpartum check.  1. Postpartum evaluation of pregnancy problems: ***.  2. Pap smear {Blank:::\"declined\",\"obtained\",\"up to date\"}.  3. Contraception Plan: {Blank:::\"UNDECIDED. Discussed options.\",\"Planning pregnancy\",\"Vasectomy\",\"Condoms\",\"Postpartum Tubal Ligation\",\"Depo Provera\",\"Nexplanon\",\"Nuva Ring\",\"Mirena IUD\",\"Paragard IUD\",\"Patch\",\"OCP\",\"Withdrawal - discussed efficacy and folic acid for prevention of neural tube defects\"}.  4. Postpartum evaluation of chronic health conditions: ***.  5. Referrals: {Blank:::\"none\"}.  6. Discussed ideal body weight. There is no height or weight on file to calculate BMI. {Quality Measures Documentation Outpatient:66441}.  7. Return to work  {Blank:::\"without " "restrictions after completion of routine maternity leave\"}.  8. Next physical exam due in one year.    Order Summary                                                      No diagnosis found.   @[facility]@    Completed by: Binta Gerard M.D., [facility]. 7/19/2021 7:29 AM.  This transcription uses voice recognition software, which may contain typographical errors.  "

## 2021-07-19 NOTE — PROGRESS NOTES
Postpartum Visit  [facility]  Date of Service: 2021    Joao Lua is a 31 year old yo  who presents for a postpartum check.    Current Concerns  None    Contraception Plans  Withdrawal - discussed efficacy and folic acid for prevention of neural tube defects.    Delivery  The patient delivered at [facility] on 6/3/21 via .   Labor was spontaneous and complicated by nothing.     The baby had the following  complications:  none.   Her daughter, Radhika Yee, is currently exclusively formula feeding.     Pregnancy:  Patient Active Problem List    Diagnosis Date Noted     Pregnancy 2020     Priority: High     30 y.o.    Baby's Gender: GIRL.  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          GBS (group B Streptococcus carrier), +RV culture, currently pregnant 2021     Priority: Medium     GBS positive 21         Abnormal  ultrasound 2021     Priority: Medium     5/10/21: FL<2%, HC low normal. BPD 29%. AC 97%.         Diet controlled gestational diabetes mellitus (GDM), antepartum 2021     Priority: Medium     High 1hr GCT. Normal 3hr GTT.         Vitamin deficiency 10/28/2019     Priority: Medium     Vit D <10         Beta thalassemia trait 2015     Priority: Medium     Baseline hgb 10.0  By hemoglobin electrophoresis.   Alpha thal trait not excluded.         Dental cavities 2015     Priority: Medium     Language barrier 2015     Priority: Medium     Needs [facility] .             Objective     Blood pressure 108/72, pulse 66, weight 48.1 kg (106 lb), last menstrual period 2020, unknown if currently breastfeeding. Body mass index is 21.41 kg/m .  Prepregnancy weight: Pregravid weight not on file (BMI: Could not be calculated).  Wt Readings from  Last 3 Encounters:   21 48.1 kg (106 lb)   21 57.6 kg (127 lb)   21 58.1 kg (128 lb)     Heart: Regular rate and rhythm, no murmurs, rubs, or gallops.  Lungs: Clear to auscultation bilaterally.  No crackles.  Abdomen: Soft, nontender, bowel sounds normal.  No significant uterine tenderness.  Genitourinary:  Vulva, vagina, and cervix are normal in appearance.  Uterus is 8 weeks in size.  No adnexal fullness.  No excessive tenderness..  Extremities: Nontender, no significant edema.    Postpartum Depression Screen ) 0  Item 10 (suicidal thoughts): Negative.  Interpretation Guide: Possible Depression = 10 or greater.       Assessment and Plan     31 year old  here today for postpartum check.  1. Postpartum evaluation of pregnancy problems: Check A1c and hemoglobin.  2. Pap smear obtained.  3. Contraception Plan: Withdrawal - discussed efficacy and folic acid for prevention of neural tube defects.  4. Postpartum evaluation of chronic health conditions: None.  5. Referrals: none.  6. Discussed ideal body weight. Body mass index is 21.41 kg/m .   7. Return to work  without restrictions after completion of routine maternity leave.  8. Next physical exam due in one year.    Order Summary                                                      No diagnosis found.   No future appointments.    Completed by: Binta Gerard M.D., [facility]. 2021 7:29 AM.  This transcription uses voice recognition software, which may contain typographical errors.

## 2021-07-23 ENCOUNTER — IMMUNIZATION (OUTPATIENT)
Dept: NURSING | Facility: CLINIC | Age: 31
End: 2021-07-23
Payer: COMMERCIAL

## 2021-07-23 PROCEDURE — 91300 PR COVID VAC PFIZER DIL RECON 30 MCG/0.3 ML IM: CPT | Performed by: FAMILY MEDICINE

## 2021-07-23 PROCEDURE — 0001A PR COVID VAC PFIZER DIL RECON 30 MCG/0.3 ML IM: CPT | Performed by: FAMILY MEDICINE

## 2021-07-26 LAB
BKR LAB AP GYN ADEQUACY: NORMAL
BKR LAB AP GYN INTERPRETATION: NORMAL
BKR LAB AP HPV REFLEX: NORMAL
BKR LAB AP PREVIOUS ABNORMAL: NORMAL
HUMAN PAPILLOMA VIRUS 16 DNA: NEGATIVE
HUMAN PAPILLOMA VIRUS 18 DNA: NEGATIVE
HUMAN PAPILLOMA VIRUS FINAL DIAGNOSIS: NORMAL
HUMAN PAPILLOMA VIRUS OTHER HR: NEGATIVE
PATH REPORT.COMMENTS IMP SPEC: NORMAL
PATH REPORT.RELEVANT HX SPEC: NORMAL

## 2021-08-13 ENCOUNTER — MEDICAL CORRESPONDENCE (OUTPATIENT)
Dept: HEALTH INFORMATION MANAGEMENT | Facility: CLINIC | Age: 31
End: 2021-08-13

## 2021-08-13 ENCOUNTER — IMMUNIZATION (OUTPATIENT)
Dept: NURSING | Facility: CLINIC | Age: 31
End: 2021-08-13
Attending: FAMILY MEDICINE
Payer: COMMERCIAL

## 2021-08-13 PROCEDURE — 91300 PR COVID VAC PFIZER DIL RECON 30 MCG/0.3 ML IM: CPT

## 2021-08-13 PROCEDURE — 0002A PR COVID VAC PFIZER DIL RECON 30 MCG/0.3 ML IM: CPT

## 2021-09-27 ENCOUNTER — MEDICAL CORRESPONDENCE (OUTPATIENT)
Dept: HEALTH INFORMATION MANAGEMENT | Facility: CLINIC | Age: 31
End: 2021-09-27

## 2021-10-16 ENCOUNTER — HEALTH MAINTENANCE LETTER (OUTPATIENT)
Age: 31
End: 2021-10-16

## 2022-02-05 ENCOUNTER — HEALTH MAINTENANCE LETTER (OUTPATIENT)
Age: 32
End: 2022-02-05

## 2022-03-29 ENCOUNTER — IMMUNIZATION (OUTPATIENT)
Dept: NURSING | Facility: CLINIC | Age: 32
End: 2022-03-29
Payer: COMMERCIAL

## 2022-03-29 PROCEDURE — 0054A COVID-19,PF,PFIZER (12+ YRS): CPT

## 2022-03-29 PROCEDURE — 91305 COVID-19,PF,PFIZER (12+ YRS): CPT

## 2022-07-08 ENCOUNTER — E-VISIT (OUTPATIENT)
Dept: FAMILY MEDICINE | Facility: CLINIC | Age: 32
End: 2022-07-08
Payer: COMMERCIAL

## 2022-07-08 DIAGNOSIS — N39.0 ACUTE UTI (URINARY TRACT INFECTION): Primary | ICD-10-CM

## 2022-07-08 PROCEDURE — 99207 PR NO CHARGE LOS: CPT | Performed by: FAMILY MEDICINE

## 2022-07-08 RX ORDER — SULFAMETHOXAZOLE/TRIMETHOPRIM 800-160 MG
1 TABLET ORAL 2 TIMES DAILY
Qty: 6 TABLET | Refills: 0 | Status: SHIPPED | OUTPATIENT
Start: 2022-07-08 | End: 2022-07-11

## 2022-07-08 NOTE — PATIENT INSTRUCTIONS
Deathad Elie Her    After reviewing your responses, I've been able to diagnose you with a urinary tract infection, which is a common infection of the bladder with bacteria.  This is not a sexually transmitted infection, though urinating immediately after intercourse can help prevent infections.  Drinking lots of fluids is also helpful to clear your current infection and prevent the next one.      I have sent a prescription for antibiotics to your pharmacy to treat this infection.    It is important that you take all of your prescribed medication even if your symptoms are improving after a few doses.  Taking all of your medicine helps prevent the symptoms from returning.     If your symptoms worsen, you develop pain in your back or stomach, develop fevers, or are not improving in 5 days, please contact your primary care provider for an appointment or visit any of our convenient Walk-in or Urgent Care Centers to be seen, which can be found on our website here.    Thanks again for choosing us as your health care partner,    Ibis Arroyo MD    Urinary Tract Infections in Women  Urinary tract infections (UTIs) are most often caused by bacteria. These bacteria enter the urinary tract. The bacteria may come from inside the body. Or they may travel from the skin outside the rectum or vagina into the urethra. Female anatomy makes it easy for bacteria from the bowel to enter a woman s urinary tract, which is the most common source of UTI. This means women develop UTIs more often than men. Pain in or around the urinary tract is a common UTI symptom. But the only way to know for sure if you have a UTI for the healthcare provider to test your urine. The two tests that may be done are the urinalysis and urine culture.     Types of UTIs    Cystitis. A bladder infection (cystitis) is the most common UTI in women. You may have urgent or frequent need to pee. You may also have pain, burning when you pee, and bloody  urine.    Urethritis. This is an inflamed urethra, which is the tube that carries urine from the bladder to outside the body. You may have lower stomach or back pain. You may also have urgent or frequent need to pee.    Pyelonephritis. This is a kidney infection. If not treated, it can be serious and damage your kidneys. In severe cases, you may need to stay in the hospital. You may have a fever and lower back pain.    Medicines to treat a UTI  Most UTIs are treated with antibiotics. These kill the bacteria. The length of time you need to take them depends on the type of infection. It may be as short as 3 days. If you have repeated UTIs, you may need a low-dose antibiotic for several months. Take antibiotics exactly as directed. Don t stop taking them until all of the medicine is gone. If you stop taking the antibiotic too soon, the infection may not go away. You may also develop a resistance to the antibiotic. This can make it much harder to treat.   Lifestyle changes to treat and prevent UTIs   The lifestyle changes below will help get rid of your UTI. They may also help prevent future UTIs.     Drink plenty of fluids. This includes water, juice, or other caffeine-free drinks. Fluids help flush bacteria out of your body.    Empty your bladder. Always empty your bladder when you feel the urge to pee. And always pee before going to sleep. Urine that stays in your bladder can lead to infection. Try to pee before and after sex as well.    Practice good personal hygiene. Wipe yourself from front to back after using the toilet. This helps keep bacteria from getting into the urethra.    Use condoms during sex. These help prevent UTIs caused by sexually transmitted bacteria. Also don't use spermicides during sex. These can increase the risk for UTIs. Choose other forms of birth control instead. For women who tend to get UTIs after sex, a low-dose of a preventive antibiotic may be used. Be sure to discuss this option with  your healthcare provider.    Follow up with your healthcare provider as directed. He or she may test to make sure the infection has cleared. If needed, more treatment may be started.  [place] last reviewed this educational content on 7/1/2019 2000-2021 [employer]. All rights reserved. This information is not intended as a substitute for professional medical care. Always follow your healthcare professional's instructions.

## 2022-09-23 ENCOUNTER — IMMUNIZATION (OUTPATIENT)
Dept: FAMILY MEDICINE | Facility: CLINIC | Age: 32
End: 2022-09-23
Payer: COMMERCIAL

## 2022-09-23 PROCEDURE — 90471 IMMUNIZATION ADMIN: CPT

## 2022-09-23 PROCEDURE — 90686 IIV4 VACC NO PRSV 0.5 ML IM: CPT

## 2023-05-13 ENCOUNTER — HEALTH MAINTENANCE LETTER (OUTPATIENT)
Age: 33
End: 2023-05-13

## 2023-06-01 ENCOUNTER — APPOINTMENT (OUTPATIENT)
Dept: INTERPRETER SERVICES | Facility: CLINIC | Age: 33
End: 2023-06-01
Payer: COMMERCIAL

## 2023-10-09 ENCOUNTER — OFFICE VISIT (OUTPATIENT)
Dept: FAMILY MEDICINE | Facility: CLINIC | Age: 33
End: 2023-10-09
Payer: COMMERCIAL

## 2023-10-09 VITALS
HEIGHT: 59 IN | HEART RATE: 78 BPM | TEMPERATURE: 99.9 F | SYSTOLIC BLOOD PRESSURE: 136 MMHG | RESPIRATION RATE: 23 BRPM | DIASTOLIC BLOOD PRESSURE: 74 MMHG | BODY MASS INDEX: 23.18 KG/M2 | OXYGEN SATURATION: 99 % | WEIGHT: 115 LBS

## 2023-10-09 DIAGNOSIS — Z00.00 ROUTINE GENERAL MEDICAL EXAMINATION AT A HEALTH CARE FACILITY: Primary | ICD-10-CM

## 2023-10-09 DIAGNOSIS — Z13.6 SCREENING FOR CARDIOVASCULAR CONDITION: ICD-10-CM

## 2023-10-09 DIAGNOSIS — Z86.32 HISTORY OF GESTATIONAL DIABETES: ICD-10-CM

## 2023-10-09 LAB
CHOLEST SERPL-MCNC: 214 MG/DL
HBA1C MFR BLD: 5.1 % (ref 0–5.6)
HDLC SERPL-MCNC: 74 MG/DL
LDLC SERPL CALC-MCNC: 126 MG/DL
NONHDLC SERPL-MCNC: 140 MG/DL
TRIGL SERPL-MCNC: 72 MG/DL

## 2023-10-09 PROCEDURE — 83036 HEMOGLOBIN GLYCOSYLATED A1C: CPT | Performed by: FAMILY MEDICINE

## 2023-10-09 PROCEDURE — 80061 LIPID PANEL: CPT | Performed by: FAMILY MEDICINE

## 2023-10-09 PROCEDURE — 90480 ADMN SARSCOV2 VAC 1/ONLY CMP: CPT | Performed by: FAMILY MEDICINE

## 2023-10-09 PROCEDURE — 36415 COLL VENOUS BLD VENIPUNCTURE: CPT | Performed by: FAMILY MEDICINE

## 2023-10-09 PROCEDURE — 99395 PREV VISIT EST AGE 18-39: CPT | Mod: 25 | Performed by: FAMILY MEDICINE

## 2023-10-09 PROCEDURE — 91320 SARSCV2 VAC 30MCG TRS-SUC IM: CPT | Performed by: FAMILY MEDICINE

## 2023-10-09 PROCEDURE — 90471 IMMUNIZATION ADMIN: CPT | Performed by: FAMILY MEDICINE

## 2023-10-09 PROCEDURE — 90686 IIV4 VACC NO PRSV 0.5 ML IM: CPT | Performed by: FAMILY MEDICINE

## 2023-10-09 RX ORDER — PRENATAL VIT/IRON FUM/FOLIC AC 27MG-0.8MG
1 TABLET ORAL DAILY
Qty: 90 TABLET | Refills: 3 | Status: SHIPPED | OUTPATIENT
Start: 2023-10-09

## 2023-10-09 ASSESSMENT — ENCOUNTER SYMPTOMS
SHORTNESS OF BREATH: 0
DIZZINESS: 0
BREAST MASS: 0
HEARTBURN: 0
HEMATURIA: 0
DIARRHEA: 0
FEVER: 0
FREQUENCY: 0
CHILLS: 0
DYSURIA: 0
MYALGIAS: 0
EYE PAIN: 0
NERVOUS/ANXIOUS: 0
COUGH: 0
ABDOMINAL PAIN: 0
SORE THROAT: 0
JOINT SWELLING: 0
HEMATOCHEZIA: 0
ARTHRALGIAS: 0
HEADACHES: 0
PARESTHESIAS: 0
PALPITATIONS: 0
WEAKNESS: 0
CONSTIPATION: 0
NAUSEA: 0

## 2023-10-09 NOTE — PROGRESS NOTES
SUBJECTIVE:   CC: Chanell is an 33 year old who presents for preventive health visit.       10/9/2023    11:33 AM   Additional Questions   Roomed by M   Accompanied by self       Healthy Habits:     Getting at least 3 servings of Calcium per day:  Yes    Bi-annual eye exam:  NO    Dental care twice a year:  NO    Sleep apnea or symptoms of sleep apnea:  None    Diet:  Regular (no restrictions)    Frequency of exercise:  6-7 days/week    Duration of exercise:  30-45 minutes    Taking medications regularly:  Not Applicable    Medication side effects:  Not applicable    Additional concerns today:  No      Today's PHQ-2 Score:       10/9/2023    11:39 AM   PHQ-2 ( 1999 [employer])   Q1: Little interest or pleasure in doing things 0   Q2: Feeling down, depressed or hopeless 0   PHQ-2 Score 0   Q1: Little interest or pleasure in doing things Not at all   Q2: Feeling down, depressed or hopeless Not at all   PHQ-2 Score 0       Social History     Tobacco Use    Smoking status: Never    Smokeless tobacco: Never    Tobacco comments:     no passive smoke exposure   Substance Use Topics    Alcohol use: No             10/9/2023    11:38 AM   Alcohol Use   Prescreen: >3 drinks/day or >7 drinks/week? Not Applicable     Reviewed orders with patient.  Reviewed health maintenance and updated orders accordingly - Yes      Breast Cancer Screening:        10/9/2023    11:39 AM   Breast CA Risk Assessment (FHS-7)   Do you have a family history of breast, colon, or ovarian cancer? No / Unknown         Pertinent mammograms are reviewed under the imaging tab.    History of abnormal Pap smear: NO - age 30-65 PAP every 5 years with negative HPV co-testing recommended      7/19/2021    11:03 AM 11/17/2017    11:23 AM 4/9/2015    10:00 AM   PAP / HPV   PAP Negative for Intraepithelial Lesion or Malignancy (NILM)  Negative for squamous intraepithelial lesion or malignancy  Electronically signed by Paula Pozo CT (ASCP) on 11/30/2017 at 12:37  "PM    Negative for squamous intraepithelial lesion or malignancy  Electronically signed by Paula Pozo CT (ASCP) on 4/14/2015 at  3:08 PM      HPV 16 DNA Negative      HPV 18 DNA Negative      Other HR HPV Negative        Reviewed and updated as needed this visit by clinical staff   Tobacco  Allergies  Meds  Problems  Med Hx            Reviewed and updated as needed this visit by Provider      Problems  Med Hx             Review of Systems   Constitutional:  Negative for chills and fever.   HENT:  Negative for congestion, ear pain, hearing loss and sore throat.    Eyes:  Negative for pain and visual disturbance.   Respiratory:  Negative for cough and shortness of breath.    Cardiovascular:  Negative for chest pain, palpitations and peripheral edema.   Gastrointestinal:  Negative for abdominal pain, constipation, diarrhea, heartburn, hematochezia and nausea.   Breasts:  Negative for tenderness, breast mass and discharge.   Genitourinary:  Negative for dysuria, frequency, genital sores, hematuria, pelvic pain, urgency, vaginal bleeding and vaginal discharge.   Musculoskeletal:  Negative for arthralgias, joint swelling and myalgias.   Skin:  Negative for rash.   Neurological:  Negative for dizziness, weakness, headaches and paresthesias.   Psychiatric/Behavioral:  Negative for mood changes. The patient is not nervous/anxious.           OBJECTIVE:   /74 (BP Location: Left arm, Patient Position: Sitting, Cuff Size: Adult Regular)   Pulse 78   Temp 99.9  F (37.7  C) (Oral)   Resp 23   Ht 1.51 m (4' 11.45\")   Wt 52.2 kg (115 lb)   LMP 09/21/2023   SpO2 99%   Breastfeeding No   BMI 22.88 kg/m    Physical Exam  GENERAL: healthy, alert and no distress  EYES: Eyes grossly normal to inspection, PERRL and conjunctivae and sclerae normal  HENT: ear canals and TM's normal, nose and mouth without ulcers or lesions  NECK: no adenopathy, no asymmetry, masses, or scars and thyroid normal to " palpation  RESP: lungs clear to auscultation - no rales, rhonchi or wheezes  BREAST: normal without masses, tenderness or nipple discharge and no palpable axillary masses or adenopathy  CV: regular rate and rhythm, normal S1 S2, no S3 or S4, no murmur, click or rub, no peripheral edema and peripheral pulses strong  ABDOMEN: soft, nontender, no hepatosplenomegaly, no masses and bowel sounds normal  MS: no gross musculoskeletal defects noted, no edema  SKIN: no suspicious lesions or rashes  NEURO: Normal strength and tone, mentation intact and speech normal  PSYCH: mentation appears normal, affect normal/bright    Diagnostic Test Results:  Labs reviewed in Epic    ASSESSMENT/PLAN:   Chanell was seen today for physical.    Diagnoses and all orders for this visit:    Routine general medical examination at a health care facility  -     Prenatal Vit-Fe Fumarate-FA (PRENATAL MULTIVITAMIN W/IRON) 27-0.8 MG tablet; Take 1 tablet by mouth daily    History of gestational diabetes  -     Hemoglobin A1c; Future  -     Hemoglobin A1c    Screening for cardiovascular condition  -     Lipid panel reflex to direct LDL Fasting; Future  -     Lipid panel reflex to direct LDL Fasting    Other orders  -     PRIMARY CARE FOLLOW-UP SCHEDULING; Future  -     REVIEW OF HEALTH MAINTENANCE PROTOCOL ORDERS  -     INFLUENZA VACCINE IM > 6 MONTHS VALENT IIV4 (AFLURIA/FLUZONE)  -     COVID-19 12+ (2023-24) (PFIZER)        COUNSELING:  Reviewed preventive health counseling, as reflected in patient instructions        She reports that she has never smoked. She has never used smokeless tobacco.          Binta Gerard MD  [facility]    Prior to immunization administration, verified patients identity using patient s name and date of birth. Please see Immunization Activity for additional information.     Screening Questionnaire for Adult Immunization    Are you sick today?   No   Do you have allergies to medications, food, a  vaccine component or latex?   No   Have you ever had a serious reaction after receiving a vaccination?   No   Do you have a long-term health problem with heart, lung, kidney, or metabolic disease (e.g., diabetes), asthma, a blood disorder, no spleen, complement component deficiency, a cochlear implant, or a spinal fluid leak?  Are you on long-term aspirin therapy?   No   Do you have cancer, leukemia, HIV/AIDS, or any other immune system problem?   No   Do you have a parent, brother, or sister with an immune system problem?   No   In the past 3 months, have you taken medications that affect  your immune system, such as prednisone, other steroids, or anticancer drugs; drugs for the treatment of rheumatoid arthritis, Crohn s disease, or psoriasis; or have you had radiation treatments?   No   Have you had a seizure, or a brain or other nervous system problem?   No   During the past year, have you received a transfusion of blood or blood    products, or been given immune (gamma) globulin or antiviral drug?   No   For women: Are you pregnant or is there a chance you could become       pregnant during the next month?   No   Have you received any vaccinations in the past 4 weeks?   No     Immunization questionnaire answers were all negative.      Patient instructed to remain in clinic for 15 minutes afterwards, and to report any adverse reactions.     Screening performed by JOY Fishman MA on 10/9/2023 at 11:42 AM.

## 2023-10-10 ENCOUNTER — TELEPHONE (OUTPATIENT)
Dept: FAMILY MEDICINE | Facility: CLINIC | Age: 33
End: 2023-10-10
Payer: COMMERCIAL

## 2023-10-10 PROBLEM — E78.5 HYPERLIPIDEMIA LDL GOAL <130: Status: ACTIVE | Noted: 2023-10-10

## 2023-10-10 NOTE — TELEPHONE ENCOUNTER
----- Message from Binta Gerard MD sent at 10/10/2023 12:13 PM CDT -----  Please let Lua know:  Your cholesterol is HIGH. Lowering your cholesterol will help your heart stay healthy. You don't need medicine at this time. But you do want to change how you eat and be more active.    To improve your cholesterol:  Limit red meat, fatty dairy (like whole milk, butter, and ice cream), and fried food.  Limit sugary foods and beverages (like juice, pop and sports drinks).  Eat more fruits, vegetables, whole grain (like brown rice, whole wheat bread, and oatmeal), fish and nuts.  Exercise. The goal is 150 minutes of moderate intensity exercise per week.

## 2023-10-12 NOTE — TELEPHONE ENCOUNTER
Called pt with help from a ong . Left message to call clinic back.    If pt calls back, please relay Dr. Gerard message. Thanks      Everton Juárez, BSN RN  [facility]

## 2023-10-16 ENCOUNTER — APPOINTMENT (OUTPATIENT)
Dept: INTERPRETER SERVICES | Facility: CLINIC | Age: 33
End: 2023-10-16
Payer: COMMERCIAL

## 2023-10-16 NOTE — TELEPHONE ENCOUNTER
Called patient with [facility]  Gabrielle and relayed provider message. Patient verbalizes understanding, declined result letter for records with provider lifestyle modification recommendations included.

## 2024-09-09 ENCOUNTER — PATIENT OUTREACH (OUTPATIENT)
Dept: CARE COORDINATION | Facility: CLINIC | Age: 34
End: 2024-09-09
Payer: COMMERCIAL

## 2024-09-18 ENCOUNTER — ALLIED HEALTH/NURSE VISIT (OUTPATIENT)
Dept: FAMILY MEDICINE | Facility: CLINIC | Age: 34
End: 2024-09-18
Payer: COMMERCIAL

## 2024-09-18 DIAGNOSIS — Z23 ENCOUNTER FOR IMMUNIZATION: Primary | ICD-10-CM

## 2024-09-18 PROCEDURE — 99207 PR NO CHARGE NURSE ONLY: CPT

## 2024-09-18 PROCEDURE — 90471 IMMUNIZATION ADMIN: CPT

## 2024-09-18 PROCEDURE — 90656 IIV3 VACC NO PRSV 0.5 ML IM: CPT

## 2024-09-18 NOTE — PROGRESS NOTES
Prior to immunization administration, verified patients identity using patient s name and date of birth. Please see Immunization Activity for additional information.     Is the patient's temperature normal (100.5 or less)? Yes     Patient MEETS CRITERIA. PROCEED with vaccine administration.          9/18/2024   INFLUENZA   Would you like to receive the flu shot or the nasal flu vaccine today? Flu Shot   Have you had a serious reaction to a flu vaccine or something in a flu vaccine? No   Have you had Guillain-[place] syndrome within 6 weeks of getting a vaccine? No   Have you received a bone marrow transplant within the previous 6 months? No            Patient MEETS CRITERIA. PROCEED with vaccine administration.        Patient instructed to remain in clinic for 15 minutes afterwards, and to report any adverse reactions.      Link to Ancillary Visit Immunization Standing Orders SmartSet     Screening performed by Neela Mccoy on 9/18/2024 at 12:49 PM.

## 2024-09-23 ENCOUNTER — PATIENT OUTREACH (OUTPATIENT)
Dept: CARE COORDINATION | Facility: CLINIC | Age: 34
End: 2024-09-23
Payer: COMMERCIAL

## 2024-12-07 ENCOUNTER — HEALTH MAINTENANCE LETTER (OUTPATIENT)
Age: 34
End: 2024-12-07